# Patient Record
Sex: MALE | Race: OTHER | NOT HISPANIC OR LATINO | URBAN - METROPOLITAN AREA
[De-identification: names, ages, dates, MRNs, and addresses within clinical notes are randomized per-mention and may not be internally consistent; named-entity substitution may affect disease eponyms.]

---

## 2023-04-07 ENCOUNTER — OFFICE VISIT (OUTPATIENT)
Dept: FAMILY MEDICINE CLINIC | Facility: CLINIC | Age: 58
End: 2023-04-07

## 2023-04-07 VITALS
OXYGEN SATURATION: 99 % | WEIGHT: 180 LBS | HEIGHT: 70 IN | SYSTOLIC BLOOD PRESSURE: 120 MMHG | TEMPERATURE: 97.7 F | BODY MASS INDEX: 25.77 KG/M2 | RESPIRATION RATE: 16 BRPM | HEART RATE: 79 BPM | DIASTOLIC BLOOD PRESSURE: 80 MMHG

## 2023-04-07 DIAGNOSIS — Z11.59 ENCOUNTER FOR HEPATITIS C SCREENING TEST FOR LOW RISK PATIENT: ICD-10-CM

## 2023-04-07 DIAGNOSIS — Z13.0 SCREENING FOR DEFICIENCY ANEMIA: ICD-10-CM

## 2023-04-07 DIAGNOSIS — Z13.1 SCREENING FOR DIABETES MELLITUS: Primary | ICD-10-CM

## 2023-04-07 DIAGNOSIS — Z11.4 SCREENING FOR HIV (HUMAN IMMUNODEFICIENCY VIRUS): ICD-10-CM

## 2023-04-07 DIAGNOSIS — Z12.83 SKIN CANCER SCREENING: ICD-10-CM

## 2023-04-07 DIAGNOSIS — N40.0 BENIGN PROSTATIC HYPERPLASIA WITHOUT LOWER URINARY TRACT SYMPTOMS: ICD-10-CM

## 2023-04-07 DIAGNOSIS — Z12.11 SCREENING FOR COLON CANCER: Primary | ICD-10-CM

## 2023-04-07 DIAGNOSIS — E78.2 MIXED HYPERLIPIDEMIA: ICD-10-CM

## 2023-04-07 DIAGNOSIS — Z23 NEED FOR VACCINATION: ICD-10-CM

## 2023-04-07 DIAGNOSIS — Z12.5 SCREENING FOR PROSTATE CANCER: ICD-10-CM

## 2023-04-07 NOTE — PROGRESS NOTES
[facility]    NAME: Simone Donovan  AGE: 65 y o  SEX: male  : 1965     DATE: 2023     Assessment and Plan:     Patient was seen for annual physical exam   He is also here to establish care after transferring from his previous PCP  He has no complaints  His physical exam today was unremarkable  His past medical history was reviewed including history of hypertension and prior history of atrial fibrillation which was treated with ablation  He sees cardiologist at [facility] annually  He requested referrals to urology as well and has dermatology  He is due for routine screening blood work which was ordered today  He is due for colonoscopy which was also ordered as well  Problem List Items Addressed This Visit    None  Visit Diagnoses     Screening for colon cancer    -  Primary    Relevant Orders    Ambulatory referral for colonoscopy    Need for vaccination        Mixed hyperlipidemia        Relevant Orders    Comprehensive metabolic panel    Lipid Panel with Direct LDL reflex    TSH, 3rd generation    Screening for prostate cancer        Relevant Orders    PSA, Total Screen    Encounter for hepatitis C screening test for low risk patient        Relevant Orders    Hepatitis C antibody    Screening for HIV (human immunodeficiency virus)        Relevant Orders    : HIV 1/2 AB/AG w Reflex [facility] for 2 yr old and above    Screening for deficiency anemia        Relevant Orders    CBC and differential    Skin cancer screening        Relevant Orders    Ambulatory Referral to Dermatology    Benign prostatic hyperplasia without lower urinary tract symptoms        Relevant Orders    Ambulatory Referral to Urology          Immunizations and preventive care screenings were discussed with patient today  Appropriate education was printed on patient's after visit summary      Discussed risks and benefits of prostate cancer screening  We discussed the controversial history of PSA screening for prostate cancer in the [place] as well as the risk of over detection and over treatment of prostate cancer by way of PSA screening  The patient understands that PSA blood testing is an imperfect way to screen for prostate cancer and that elevated PSA levels in the blood may also be caused by infection, inflammation, prostatic trauma or manipulation, urological procedures, or by benign prostatic enlargement  The role of the digital rectal examination in prostate cancer screening was also discussed and I discussed with him that there is large interobserver variability in the findings of digital rectal examination  Counseling:  Alcohol/drug use: discussed moderation in alcohol intake, the recommendations for healthy alcohol use, and avoidance of illicit drug use  Dental Health: discussed importance of regular tooth brushing, flossing, and dental visits  Injury prevention: discussed safety/seat belts, safety helmets, smoke detectors, carbon dioxide detectors, and smoking near bedding or upholstery  Exercise: the importance of regular exercise/physical activity was discussed  Recommend exercise 3-5 times per week for at least 30 minutes  Return in about 6 months (around 10/7/2023)  Chief Complaint:     Chief Complaint   Patient presents with   • Establish Care      History of Present Illness:     Adult Annual Physical   Patient here for a comprehensive physical exam  The patient reports no problems  Diet and Physical Activity  Diet/Nutrition: well balanced diet and consuming 3-5 servings of fruits/vegetables daily  Exercise: walking and strength training exercises        Depression Screening  PHQ-2/9 Depression Screening    Little interest or pleasure in doing things: 0 - not at all  Feeling down, depressed, or hopeless: 0 - not at all  PHQ-2 Score: 0  PHQ-2 Interpretation: Negative depression screen       General Health  Sleep: gets 4-6 hours of sleep on average  Hearing: normal - bilateral   Vision: goes for regular eye exams, most recent eye exam <1 year ago and wears glasses  Dental: regular dental visits   Health  Symptoms include: nocturia     Review of Systems:     Review of Systems   Constitutional: Negative  HENT: Negative  Negative for congestion, ear pain, hearing loss, nosebleeds, sore throat and trouble swallowing  Eyes: Negative  Respiratory: Negative for apnea, cough, chest tightness, shortness of breath and wheezing  Cardiovascular: Negative  Gastrointestinal: Negative for abdominal pain, blood in stool, constipation, diarrhea, nausea and vomiting  Endocrine: Negative  Genitourinary: Negative for difficulty urinating, dysuria, frequency, hematuria and urgency  Musculoskeletal: Negative for arthralgias, joint swelling and myalgias  Skin: Negative for rash  Neurological: Negative for dizziness, syncope, light-headedness, numbness and headaches  Hematological: Negative  Psychiatric/Behavioral: Negative for confusion, dysphoric mood and sleep disturbance  The patient is not nervous/anxious  Past Medical History:     Past Medical History:   Diagnosis Date   • [facility] ([facility] )    • Hypertension       Past Surgical History:     Past Surgical History:   Procedure Laterality Date   • HERNIA REPAIR     • VASECTOMY        Family History:     History reviewed  No pertinent family history  Social History:     Social History     Socioeconomic History   • Marital status: /Civil Union     Spouse name: None   • Number of children: None   • Years of education: None   • Highest education level: None   Occupational History   • None   Tobacco Use   • Smoking status: Never   • Smokeless tobacco: Never   Vaping Use   • Vaping Use: Never used   Substance and Sexual Activity   • Alcohol use:  Yes   • Drug use: None   • Sexual activity: None   Other Topics Concern   • None   Social "History Narrative   • None     Social Determinants of Health     Financial Resource Strain: Not on file   Food Insecurity: Not on file   Transportation Needs: Not on file   Physical Activity: Not on file   Stress: Not on file   Social Connections: Not on file   Intimate Partner Violence: Not on file   Housing Stability: Not on file      Current Medications:     Current Outpatient Medications   Medication Sig Dispense Refill   • amLODIPine (NORVASC) 2 5 mg tablet Take 2 5 mg by mouth daily     • Ascorbic Acid (vitamin C) 100 MG tablet Take 100 mg by mouth daily     • Saw Torreon 160 MG CAPS Take by mouth     • Turmeric 500 MG CAPS Take by mouth     • valsartan-hydrochlorothiazide (DIOVAN-HCT) 80-12 5 MG per tablet Take 1 tablet by mouth daily       No current facility-administered medications for this visit  Allergies:     No Known Allergies   Physical Exam:     /80 (BP Location: Right arm, Patient Position: Sitting, Cuff Size: Adult)   Pulse 79   Temp 97 7 °F (36 5 °C) (Temporal)   Resp 16   Ht 5' 10\" (1 778 m)   Wt 81 6 kg (180 lb)   SpO2 99%   BMI 25 83 kg/m²     Physical Exam  Vitals and nursing note reviewed  Constitutional:       Appearance: He is well-developed  HENT:      Head: Normocephalic  Right Ear: External ear normal       Left Ear: External ear normal       Nose: Nose normal    Eyes:      Conjunctiva/sclera: Conjunctivae normal       Pupils: Pupils are equal, round, and reactive to light  Cardiovascular:      Rate and Rhythm: Normal rate and regular rhythm  Heart sounds: Normal heart sounds  Pulmonary:      Effort: Pulmonary effort is normal       Breath sounds: Normal breath sounds  Abdominal:      General: Bowel sounds are normal       Palpations: Abdomen is soft  Musculoskeletal:         General: Normal range of motion  Cervical back: Normal range of motion and neck supple  Skin:     General: Skin is warm and dry     Psychiatric:         Behavior: " Behavior normal          Thought Content:  Thought content normal          Judgment: Judgment normal           Ghazal Gabriel, DO  [facility]

## 2023-05-04 ENCOUNTER — TELEPHONE (OUTPATIENT)
Dept: GASTROENTEROLOGY | Facility: CLINIC | Age: 58
End: 2023-05-04

## 2023-05-04 ENCOUNTER — PREP FOR PROCEDURE (OUTPATIENT)
Dept: GASTROENTEROLOGY | Facility: CLINIC | Age: 58
End: 2023-05-04

## 2023-05-04 DIAGNOSIS — Z12.11 SCREENING FOR COLON CANCER: Primary | ICD-10-CM

## 2023-05-08 DIAGNOSIS — I10 ESSENTIAL HYPERTENSION: Primary | ICD-10-CM

## 2023-05-13 RX ORDER — AMLODIPINE AND VALSARTAN 5; 160 MG/1; MG/1
1 TABLET ORAL DAILY
Qty: 30 TABLET | Refills: 5
Start: 2023-05-13 | End: 2023-05-18 | Stop reason: SDUPTHER

## 2023-05-18 DIAGNOSIS — I10 ESSENTIAL HYPERTENSION: ICD-10-CM

## 2023-05-18 RX ORDER — AMLODIPINE AND VALSARTAN 5; 160 MG/1; MG/1
1 TABLET ORAL DAILY
Qty: 30 TABLET | Refills: 5 | Status: SHIPPED | OUTPATIENT
Start: 2023-05-18

## 2023-05-30 ENCOUNTER — TELEPHONE (OUTPATIENT)
Dept: GASTROENTEROLOGY | Facility: MEDICAL CENTER | Age: 58
End: 2023-05-30

## 2023-05-30 DIAGNOSIS — Z12.11 SCREENING FOR COLON CANCER: Primary | ICD-10-CM

## 2023-05-30 RX ORDER — BISACODYL 5 MG/1
TABLET, DELAYED RELEASE ORAL
Qty: 2 TABLET | Refills: 0 | Status: SHIPPED | OUTPATIENT
Start: 2023-05-30

## 2023-05-30 NOTE — TELEPHONE ENCOUNTER
Spoke with patient to confirm 6/16 procedure, informed that prep instructions sent to [facility] and prep medication sent to pharmacy

## 2023-06-15 RX ORDER — SODIUM CHLORIDE 9 MG/ML
125 INJECTION, SOLUTION INTRAVENOUS CONTINUOUS
Status: CANCELLED | OUTPATIENT
Start: 2023-06-15

## 2023-06-16 ENCOUNTER — ANESTHESIA EVENT (OUTPATIENT)
Dept: GASTROENTEROLOGY | Facility: MEDICAL CENTER | Age: 58
End: 2023-06-16

## 2023-06-16 ENCOUNTER — ANESTHESIA (OUTPATIENT)
Dept: GASTROENTEROLOGY | Facility: MEDICAL CENTER | Age: 58
End: 2023-06-16

## 2023-06-16 ENCOUNTER — HOSPITAL ENCOUNTER (OUTPATIENT)
Dept: GASTROENTEROLOGY | Facility: MEDICAL CENTER | Age: 58
Setting detail: OUTPATIENT SURGERY
End: 2023-06-16
Payer: COMMERCIAL

## 2023-06-16 VITALS
HEIGHT: 70 IN | HEART RATE: 62 BPM | BODY MASS INDEX: 24.33 KG/M2 | DIASTOLIC BLOOD PRESSURE: 64 MMHG | TEMPERATURE: 98 F | RESPIRATION RATE: 18 BRPM | OXYGEN SATURATION: 98 % | SYSTOLIC BLOOD PRESSURE: 107 MMHG | WEIGHT: 169.97 LBS

## 2023-06-16 DIAGNOSIS — Z12.11 SCREENING FOR COLON CANCER: ICD-10-CM

## 2023-06-16 PROCEDURE — 45385 COLONOSCOPY W/LESION REMOVAL: CPT | Performed by: STUDENT IN AN ORGANIZED HEALTH CARE EDUCATION/TRAINING PROGRAM

## 2023-06-16 PROCEDURE — 88305 TISSUE EXAM BY PATHOLOGIST: CPT | Performed by: PATHOLOGY

## 2023-06-16 RX ORDER — PROPOFOL 10 MG/ML
INJECTION, EMULSION INTRAVENOUS AS NEEDED
Status: DISCONTINUED | OUTPATIENT
Start: 2023-06-16 | End: 2023-06-16

## 2023-06-16 RX ORDER — SODIUM CHLORIDE 9 MG/ML
125 INJECTION, SOLUTION INTRAVENOUS CONTINUOUS
Status: DISCONTINUED | OUTPATIENT
Start: 2023-06-16 | End: 2023-06-20 | Stop reason: HOSPADM

## 2023-06-16 RX ORDER — MULTIVITAMIN
1 TABLET ORAL DAILY
COMMUNITY

## 2023-06-16 RX ORDER — LIDOCAINE HYDROCHLORIDE 20 MG/ML
INJECTION, SOLUTION EPIDURAL; INFILTRATION; INTRACAUDAL; PERINEURAL AS NEEDED
Status: DISCONTINUED | OUTPATIENT
Start: 2023-06-16 | End: 2023-06-16

## 2023-06-16 RX ORDER — PROPOFOL 10 MG/ML
INJECTION, EMULSION INTRAVENOUS CONTINUOUS PRN
Status: DISCONTINUED | OUTPATIENT
Start: 2023-06-16 | End: 2023-06-16

## 2023-06-16 RX ADMIN — PROPOFOL 160 MCG/KG/MIN: 10 INJECTION, EMULSION INTRAVENOUS at 09:36

## 2023-06-16 RX ADMIN — LIDOCAINE HYDROCHLORIDE 100 MG: 20 INJECTION, SOLUTION EPIDURAL; INFILTRATION; INTRACAUDAL; PERINEURAL at 09:36

## 2023-06-16 RX ADMIN — SODIUM CHLORIDE 125 ML/HR: 0.9 INJECTION, SOLUTION INTRAVENOUS at 09:22

## 2023-06-16 RX ADMIN — Medication 40 MG: at 09:44

## 2023-06-16 RX ADMIN — SODIUM CHLORIDE: 0.9 INJECTION, SOLUTION INTRAVENOUS at 09:59

## 2023-06-16 RX ADMIN — PROPOFOL 150 MG: 10 INJECTION, EMULSION INTRAVENOUS at 09:36

## 2023-06-16 NOTE — ANESTHESIA POSTPROCEDURE EVALUATION
Post-Op Assessment Note    CV Status:  Stable    Pain management: adequate     Mental Status:  Alert and awake   Hydration Status:  Euvolemic   PONV Controlled:  Controlled   Airway Patency:  Patent      Post Op Vitals Reviewed: Yes      Staff: Anesthesiologist         No notable events documented      /65 (06/16/23 1008)    Temp      Pulse 75 (06/16/23 1008)   Resp 20 (06/16/23 1008)    SpO2 98 % (06/16/23 1008)

## 2023-06-16 NOTE — ANESTHESIA PREPROCEDURE EVALUATION
Procedure:  COLONOSCOPY    Relevant Problems   CARDIO   (+) Hypertension        Physical Exam    Airway    Mallampati score: I  TM Distance: >3 FB  Neck ROM: full     Dental       Cardiovascular  Rhythm: regular, Rate: normal, Cardiovascular exam normal    Pulmonary  Pulmonary exam normal     Other Findings        Anesthesia Plan  ASA Score- 2     Anesthesia Type- IV sedation with anesthesia with ASA Monitors  Additional Monitors:   Airway Plan:           Plan Factors-Exercise tolerance (METS): >4 METS  Chart reviewed  Existing labs reviewed  Patient summary reviewed  Patient is not a current smoker  Obstructive sleep apnea risk education given perioperatively  Induction- intravenous  Postoperative Plan-     Informed Consent- Anesthetic plan and risks discussed with patient

## 2023-06-16 NOTE — H&P
"History and Physical - [facility]  Renzo Yin 65 y o  male MRN: [id number]          HPI: Renzo Yin is a 65y o  year old male who presents for screening colonoscopy  Reportedly had a normal colonoscopy in 2012 (in [address] [place] [address])  No family history of colon cancer  REVIEW OF SYSTEMS: Per the HPI, and otherwise unremarkable  Historical Information   Past Medical History:   Diagnosis Date   • A-fib ([address] )    • Hypertension      Past Surgical History:   Procedure Laterality Date   • CARDIAC ELECTROPHYSIOLOGY MAPPING AND ABLATION     • COLONOSCOPY     • HERNIA REPAIR     • VASECTOMY       Social History   Social History     Substance and Sexual Activity   Alcohol Use Yes    Comment: occasional     Social History     Substance and Sexual Activity   Drug Use Never     Social History     Tobacco Use   Smoking Status Never   Smokeless Tobacco Never     History reviewed  No pertinent family history      Meds/Allergies       Current Outpatient Medications:   •  amLODIPine-valsartan (EXFORGE) 5-160 MG per tablet  •  Ascorbic Acid (vitamin C) 100 MG tablet  •  loratadine (CLARITIN) 5 MG chewable tablet  •  Multiple Vitamin (multivitamin) tablet  •  Saw Palmetto 160 MG CAPS  •  Turmeric 500 MG CAPS  •  amLODIPine (NORVASC) 2 5 mg tablet  •  bisacodyl (DULCOLAX) 5 mg EC tablet  •  polyethylene glycol (GOLYTELY) 4000 mL solution    Current Facility-Administered Medications:   •  sodium chloride 0 9 % infusion, 125 mL/hr, Intravenous, Continuous, 125 mL/hr at 06/16/23 4836    No Known Allergies    Objective     /76   Pulse 66   Temp 98 °F (36 7 °C) (Temporal)   Resp 15   Ht 5' 10\" (1 778 m)   Wt 77 1 kg (169 lb 15 6 oz)   SpO2 98%   BMI 24 39 kg/m²       PHYSICAL EXAM    GEN: NAD  CARDIO: RRR  PULM: CTA bilaterally  ABD: soft, non-tender, non-distended  EXT: no lower extremity edema  NEURO: AAOx3      ASSESSMENT/PLAN:  65y o  year old male here for colonoscopy; he is stable and " optimized for his procedure

## 2023-06-20 PROCEDURE — 88305 TISSUE EXAM BY PATHOLOGIST: CPT | Performed by: PATHOLOGY

## 2023-07-06 ENCOUNTER — OFFICE VISIT (OUTPATIENT)
Dept: DERMATOLOGY | Facility: CLINIC | Age: 58
End: 2023-07-06
Payer: COMMERCIAL

## 2023-07-06 VITALS — WEIGHT: 174.1 LBS | BODY MASS INDEX: 24.92 KG/M2 | HEIGHT: 70 IN | TEMPERATURE: 98 F

## 2023-07-06 DIAGNOSIS — Z12.83 SCREENING FOR SKIN CANCER: Primary | ICD-10-CM

## 2023-07-06 PROCEDURE — 99202 OFFICE O/P NEW SF 15 MIN: CPT | Performed by: DERMATOLOGY

## 2023-07-06 NOTE — PATIENT INSTRUCTIONS
What is skin cancer? Skin cancer is unfortunately very common. That's why we are here to help you on your journey to healthy happy skin! There are two main types of skin cancer: melanoma and non-melanoma skin cancer. Melanoma is a form of skin cancer that often arises within an existing nevus or mole. However, this is not always the case. Melanoma can arise anywhere (not only where you have moles right now). Melanoma can run in families, so letting us know about your family history is important. Non-melanoma skin cancer is the most common type of cancer in the [facility]. The two main types of non-melanoma skin cancers are basal cell carcinomas (BCC) and squamous cell carcinoma (SCC). These cancers tend to be less aggressive than melanomas but are still important to look for and treat. What can I do to prevent skin cancer? One of the largest risk factors for skin cancer is sun exposure or UV radiation. Therefore, sun protection is aguilar! Here are some great tips for protecting yourself! Try to avoid direct sun exposure during peak sun hours (10 AM to 2 PM)  Remember you get A LOT of sun even under cloud coverage and through care windows! When choosing a sunscreen, look for one that says “broad spectrum” sunscreen. This means it protects you from more of the harmful UV rays. Choose a sunscreen that is SPF 30 or greater for best protection. Apply sunscreen to all sun-exposed skin and reapply every 2 hours. Consider sun protective clothing! Great additions to your sun protective clothing wardrobe include broad brimmed hats, sunglasses, UPF clothing. Avoid tanning salons. These have been shown to be very harmful in terms of your risk of skin cancer. Avoid “base tans”. We now know that tans are dangerous (not just sun burns). If you want to have a tan for a trip, consider a spray tan! Should I check my skin at home between my dermatology appointments? Yes!  It's always a great idea to look at your skin on a regular basis. Here are some things to look for when monitoring your skin. For melanoma, look for the ABCDE's! A = Asymmetry. Look for a spot where one half does not match the other! B = Borders. Look for a spot that has jagged, ragged or irregular borders. C = Color. Look for a spot that is not evenly colored and often includes multiple colors, especially true black, red, white, blue, grey. D = Diameter. Look for a spot that is larger than the size of a pencil eraser. E = Evolution. If you ever have a spot that is changing in shape, color, size or symptoms (becomes itchy, painful or starts to bleed), always call us! For non-melanoma skin cancers, look for a new, pink spot that is not going away, especially one that is itchy, painful or bleeding. What should I do if I see a spot that is concerning for melanoma or non-melanoma skin cancer? If you are ever concerned, call us! Do not wait for your next appointment. We want to help!

## 2023-07-06 NOTE — PROGRESS NOTES
[facility]atology [facility] Note     Patient Name: Maile Kenney  Encounter Date: 7/6/23    Have you been cared for by a [facility] Dermatologist in the last 3 years and, if so, which description applies to you? NO. I am considered a "new" patient and must complete all patient intake questions. I am MALE/not capable of bearing children. REVIEW OF SYSTEMS:  Have you recently had or currently have any of the following? · Recent fever or chills? No  · Any non-healing wound? No   PAST MEDICAL HISTORY:  Have you personally ever had or currently have any of the following? If "YES," then please provide more detail. · Skin cancer (such as Melanoma, Basal Cell Carcinoma, Squamous Cell Carcinoma? No   · Tuberculosis, HIV/AIDS, Hepatitis B or C: No  · Systemic Immunosuppression such as Diabetes, Biologic or Immunotherapy, Chemotherapy, Organ Transplantation, Bone Marrow Transplantation No  · Radiation Treatment No   FAMILY HISTORY:  Any "first degree relatives" (parent, brother, sister, or child) with the following? • Skin Cancer, Pancreatic or Other Cancer? No   PATIENT EXPERIENCE:    • Do you want the Dermatologist to perform a COMPLETE skin exam today including a clinical examination under the "bra and underwear" areas? Yes  • If necessary, do we have your permission to call and leave a detailed message on your Preferred Phone number that includes your specific medical information?   Yes      No Known Allergies   Current Outpatient Medications:   •  amLODIPine (NORVASC) 2.5 mg tablet, Take 2.5 mg by mouth daily, Disp: , Rfl:   •  amLODIPine-valsartan (EXFORGE) 5-160 MG per tablet, Take 1 tablet by mouth daily, Disp: 30 tablet, Rfl: 5  •  Ascorbic Acid (vitamin C) 100 MG tablet, Take 100 mg by mouth daily, Disp: , Rfl:   •  loratadine (CLARITIN) 5 MG chewable tablet, Chew 5 mg daily, Disp: , Rfl:   •  Multiple Vitamin (multivitamin) tablet, Take 1 tablet by mouth daily, Disp: , Rfl:   •  Saw Palmetto 160 MG CAPS, Take by mouth, Disp: , Rfl:   •  Turmeric 500 MG CAPS, Take by mouth, Disp: , Rfl:           • Whom besides the patient is providing clinical information about today's encounter?   o NO ADDITIONAL HISTORIAN (patient alone provided history)    Physical Exam and Assessment/Plan by Diagnosis:    SCREENING FOR SKIN CANCER    Physical Exam:  • Anatomic Location Affected:  Entire body   • Morphological Description:  Normal skin appearance   • Pertinent Positives:  • Pertinent Negatives: Additional History of Present Condition:  New patient, 65year old male, here for a general skin exam. No personal or family history of skin cancer.     Assessment and Plan:  Based on a thorough discussion of this condition and the management approach to it (including a comprehensive discussion of the known risks, side effects and potential benefits of treatment), the patient (family) agrees to implement the following specific plan:  • SPF 30+ when outside with reapplication every 2-3 hours        Scribe Attestation    I,:  Saleem Bae am acting as a scribe while in the presence of the attending physician.:       I,:  Micheal Lott MD personally performed the services described in this documentation    as scribed in my presence.:

## 2023-07-11 DIAGNOSIS — I10 ESSENTIAL HYPERTENSION: ICD-10-CM

## 2023-07-11 RX ORDER — AMLODIPINE AND VALSARTAN 5; 160 MG/1; MG/1
1 TABLET ORAL DAILY
Qty: 90 TABLET | Refills: 1 | Status: SHIPPED | OUTPATIENT
Start: 2023-07-11

## 2023-07-11 NOTE — TELEPHONE ENCOUNTER
----- Message from Kimberly Mendosa sent at 7/9/2023 10:20 AM EDT -----  Regarding: Amlodipine/Valsartan  Contact: [phone]  Dr Janee Stafford,   Since this medication works well, can you change it to a 90 day script? The month goes by very quickly.   Thanks

## 2023-09-14 DIAGNOSIS — E78.2 MIXED HYPERLIPIDEMIA: Primary | ICD-10-CM

## 2023-10-12 ENCOUNTER — OFFICE VISIT (OUTPATIENT)
Dept: DERMATOLOGY | Facility: CLINIC | Age: 58
End: 2023-10-12
Payer: COMMERCIAL

## 2023-10-12 VITALS — BODY MASS INDEX: 24.34 KG/M2 | HEIGHT: 70 IN | WEIGHT: 170 LBS | TEMPERATURE: 98 F

## 2023-10-12 DIAGNOSIS — L81.4 LENTIGO: ICD-10-CM

## 2023-10-12 DIAGNOSIS — D22.9 NEVUS: ICD-10-CM

## 2023-10-12 DIAGNOSIS — D18.01 CHERRY ANGIOMA: Primary | ICD-10-CM

## 2023-10-12 DIAGNOSIS — L82.1 SEBORRHEIC KERATOSIS: ICD-10-CM

## 2023-10-12 PROCEDURE — 99213 OFFICE O/P EST LOW 20 MIN: CPT | Performed by: DERMATOLOGY

## 2023-10-12 NOTE — PROGRESS NOTES
[facility] Note     Patient Name: Chi Morris  Encounter Date: 10/12/2023     Have you been cared for by a [facility] in the last 3 years and, if so, which description applies to you? Yes. I have been here within the last 3 years, and my medical history has NOT changed since that time. I am MALE/not capable of bearing children. REVIEW OF SYSTEMS:  Have you recently had or currently have any of the following? No changes in my recent health. PAST MEDICAL HISTORY:  Have you personally ever had or currently have any of the following? If "YES," then please provide more detail. No changes in my medical history. FAMILY HISTORY:  Any "first degree relatives" (parent, brother, sister, or child) with the following? No changes in my family's known health. PATIENT EXPERIENCE:    Do you want the Dermatologist to perform a COMPLETE skin exam today including a clinical examination under the "bra and underwear" areas? Yes, refused groin and buttocks examine. If necessary, do we have your permission to call and leave a detailed message on your Preferred Phone number that includes your specific medical information? Yes      No Known Allergies   Current Outpatient Medications:     amLODIPine-valsartan (EXFORGE) 5-160 MG per tablet, Take 1 tablet by mouth daily, Disp: 90 tablet, Rfl: 1    Ascorbic Acid (vitamin C) 100 MG tablet, Take 100 mg by mouth daily, Disp: , Rfl:     loratadine (CLARITIN) 5 MG chewable tablet, Chew 5 mg daily, Disp: , Rfl:     Multiple Vitamin (multivitamin) tablet, Take 1 tablet by mouth daily, Disp: , Rfl:     Saw Palmetto 160 MG CAPS, Take by mouth, Disp: , Rfl:     Turmeric 500 MG CAPS, Take by mouth, Disp: , Rfl:     amLODIPine (NORVASC) 2.5 mg tablet, Take 2.5 mg by mouth daily, Disp: , Rfl:           Whom besides the patient is providing clinical information about today's encounter?    NO ADDITIONAL HISTORIAN (patient alone provided history)    Physical Exam and Assessment/Plan by Diagnosis:      Chief Complaint   Patient presents with    Follow-up     Concerns/ lesions on his back/ pt state had bbc hx on his back          MELANOCYTIC NEVI ("Moles")    Physical Exam:  Anatomic Location Affected:   Mostly on sun-exposed areas of the trunk and extremities  Morphological Description:  Scattered, 1-4mm round to ovoid, symmetrical-appearing, even bordered, skin colored to dark brown macules/papules, mostly in sun-exposed areas  Pertinent Positives:  Pertinent Negatives: Additional History of Present Condition:      Assessment and Plan:  Based on a thorough discussion of this condition and the management approach to it (including a comprehensive discussion of the known risks, side effects and potential benefits of treatment), the patient (family) agrees to implement the following specific plan:  When outside we recommend using a wide brim hat, sunglasses, long sleeve and pants, sunscreen with SPF 98+ with reapplication every 2 hours, or SPF specific clothing   Benign, reassured  Annual skin check     Melanocytic Nevi  Melanocytic nevi ("moles") are tan or brown, raised or flat areas of the skin which have an increased number of melanocytes. Melanocytes are the cells in our body which make pigment and account for skin color. Some moles are present at birth (I.e., "congenital nevi"), while others come up later in life (i.e., "acquired nevi"). The sun can stimulate the body to make more moles. Sunburns are not the only thing that triggers more moles. Chronic sun exposure can do it too. Clinically distinguishing a healthy mole from melanoma may be difficult, even for experienced dermatologists. The "ABCDE's" of moles have been suggested as a means of helping to alert a person to a suspicious mole and the possible increased risk of melanoma.   The suggestions for raising alert are as follows:    Asymmetry: Healthy moles tend to be symmetric, while melanomas are often asymmetric. Asymmetry means if you draw a line through the mole, the two halves do not match in color, size, shape, or surface texture. Asymmetry can be a result of rapid enlargement of a mole, the development of a raised area on a previously flat lesion, scaling, ulceration, bleeding or scabbing within the mole. Any mole that starts to demonstrate "asymmetry" should be examined promptly by a board certified dermatologist.     Border: Healthy moles tend to have discrete, even borders. The border of a melanoma often blends into the normal skin and does not sharply delineate the mole from normal skin. Any mole that starts to demonstrate "uneven borders" should be examined promptly by a board certified dermatologist.     Color: Healthy moles tend to be one color throughout. Melanomas tend to be made up of different colors ranging from dark black, blue, white, or red. Any mole that demonstrates a color change should be examined promptly by a board certified dermatologist.     Diameter: Healthy moles tend to be smaller than 0.6 cm in size; an exception are "congenital nevi" that can be larger. Melanomas tend to grow and can often be greater than 0.6 cm (1/4 of an inch, or the size of a pencil eraser). This is only a guideline, and many normal moles may be larger than 0.6 cm without being unhealthy. Any mole that starts to change in size (small to bigger or bigger to smaller) should be examined promptly by a board certified dermatologist.     Evolving: Healthy moles tend to "stay the same."  Melanomas may often show signs of change or evolution such as a change in size, shape, color, or elevation.   Any mole that starts to itch, bleed, crust, burn, hurt, or ulcerate or demonstrate a change or evolution should be examined promptly by a board certified dermatologist.        LENTIGO    Physical Exam:  Anatomic Location Affected:  trunk, arms  Morphological Description:  Light brown macules  Pertinent Positives:  Pertinent Negatives: Additional History of Present Condition:      Assessment and Plan:  Based on a thorough discussion of this condition and the management approach to it (including a comprehensive discussion of the known risks, side effects and potential benefits of treatment), the patient (family) agrees to implement the following specific plan:  When outside we recommend using a wide brim hat, sunglasses, long sleeve and pants, sunscreen with SPF 10+ with reapplication every 2 hours, or SPF specific clothing       What is a lentigo? A lentigo is a pigmented flat or slightly raised lesion with a clearly defined edge. Unlike an ephelis (freckle), it does not fade in the winter months. There are several kinds of lentigo. The name lentigo originally referred to its appearance resembling a small lentil. The plural of lentigo is lentigines, although “lentigos” is also in common use. Who gets lentigines? Lentigines can affect males and females of all ages and races. Solar lentigines are especially prevalent in fair skinned adults. Lentigines associated with syndromes are present at birth or arise during childhood. What causes lentigines? Common forms of lentigo are due to exposure to ultraviolet radiation:  Sun damage including sunburn   Indoor tanning   Phototherapy, especially photochemotherapy (PUVA)    Ionizing radiation, eg radiation therapy, can also cause lentigines. Several familial syndromes associated with widespread lentigines originate from mutations in Dennis-MAP kinase, mTOR signaling and PTEN pathways. What is the treatment for lentigines? Most lentigines are left alone. Attempts to lighten them may not be successful.  The following approaches are used:  SPF 50+ broad-spectrum sunscreen   Hydroquinone bleaching cream   Alpha hydroxy acids   Vitamin C   Retinoids   Azelaic acid   Chemical peels  Individual lesions can be permanently removed using:  Cryotherapy Intense pulsed light   Pigment lasers    How can lentigines be prevented? Lentigines associated with exposure ultraviolet radiation can be prevented by very careful sun protection. Clothing is more successful at preventing new lentigines than are sunscreens. What is the outlook for lentigines? Lentigines usually persist. They may increase in number with age and sun exposure. Some in sun-protected sites may fade and disappear. CHERRY ANGIOMAS    Physical Exam:  Anatomic Location Affected:  trunk  Morphological Description:  Scattered cherry red, 1-4 mm papules. Pertinent Positives:  Pertinent Negatives: Additional History of Present Condition:      Assessment and Plan:  Based on a thorough discussion of this condition and the management approach to it (including a comprehensive discussion of the known risks, side effects and potential benefits of treatment), the patient (family) agrees to implement the following specific plan:  Monitor for changes  Benign, reassured      Assessment and Plan:    Cherry angioma, also known as Theta Saner spots, are benign vascular skin lesions. A "cherry angioma" is a firm red, blue or purple papule, 0.1-1 cm in diameter. When thrombosed, they can appear black in colour until evaluated with a dermatoscope when the red or purple colour is more easily seen. Cherry angioma may develop on any part of the body but most often appear on the scalp, face, lips and trunk. An angioma is due to proliferating endothelial cells; these are the cells that line the inside of a blood vessel. Angiomas can arise in early life or later in life; the most common type of angioma is a cherry angioma. Cherry angiomas are very common in males and females of any age or race. They are more noticeable in white skin than in skin of colour. They markedly increase in number from about the age of 36. There may be a family history of similar lesions.  Eruptive cherry angiomas have been rarely reported to be associated with internal malignancy. The cause of angiomas is unknown. Genetic analysis of cherry angiomas has shown that they frequently carry specific somatic missense mutations in the GNAQ and GNA11 (Q209H) genes, which are involved in other vascular and melanocytic proliferations. SEBORRHEIC KERATOSIS; NON-INFLAMED    Physical Exam:  Anatomic Location Affected:  trunk  Morphological Description:  Flat and raised, waxy, smooth to warty textured, yellow to brownish-grey to dark brown to blackish, discrete, "stuck-on" appearing papules. Pertinent Positives:  Pertinent Negatives: Additional History of Present Condition:      Assessment and Plan:  Based on a thorough discussion of this condition and the management approach to it (including a comprehensive discussion of the known risks, side effects and potential benefits of treatment), the patient (family) agrees to implement the following specific plan:  Monitor for changes  Benign, reassured      Seborrheic Keratosis  A seborrheic keratosis is a harmless warty spot that appears during adult life as a common sign of skin aging. Seborrheic keratoses can arise on any area of skin, covered or uncovered, with the usual exception of the palms and soles. They do not arise from mucous membranes. Seborrheic keratoses can have highly variable appearance. Seborrheic keratoses are extremely common. It has been estimated that over 90% of adults over the age of 61 years have one or more of them. They occur in males and females of all races, typically beginning to erupt in the 35s or 45s. They are uncommon under the age of 21 years. The precise cause of seborrhoeic keratoses is not known. Seborrhoeic keratoses are considered degenerative in nature. As time goes by, seborrheic keratoses tend to become more numerous. Some people inherit a tendency to develop a very large number of them; some people may have hundreds of them.       There is no easy way to remove multiple lesions on a single occasion. Unless a specific lesion is "inflamed" and is causing pain or stinging/burning or is bleeding, most insurance companies do not authorize treatment. XEROSIS ("DRY SKIN")    Physical Exam:  Anatomic Location Affected:  diffuse  Morphological Description:  xerosis  Pertinent Positives:  Pertinent Negatives: Additional History of Present Condition:      Assessment and Plan:  Based on a thorough discussion of this condition and the management approach to it (including a comprehensive discussion of the known risks, side effects and potential benefits of treatment), the patient (family) agrees to implement the following specific plan:  Use moisturizer like Eucerin,Cerave or Aveeno Cream 3 times a day for the dry skin            Dry skin refers to skin that feels dry to touch. Dry skin has a dull surface with a rough, scaly quality. The skin is less pliable and cracked. When dryness is severe, the skin may become inflamed and fissured. Although any body site can be dry, dry skin tends to affect the shins more than any other site. Dry skin is lacking moisture in the outer horny cell layer (stratum corneum) and this results in cracks in the skin surface. Dry skin is also called xerosis, xeroderma or asteatosis (lack of fat). It can affect males and females of all ages. There is some racial variability in water and lipid content of the skin. Dry skin that starts in early childhood may be one of about 20 types of ichthyosis (fish-scale skin). There is often a family history of dry skin. Dry skin is commonly seen in people with atopic dermatitis. Nearly everyone > 60 years has dry skin. Dry skin that begins later may be seen in people with certain diseases and conditions.   Postmenopausal women  Hypothyroidism  Chronic renal disease   Malnutrition and weight loss   Subclinical dermatitis   Treatment with certain drugs such as oral retinoids, diuretics and epidermal growth factor receptor inhibitors      What is the treatment for dry skin? The mainstay of treatment of dry skin and ichthyosis is moisturisers/emollients. They should be applied liberally and often enough to:  Reduce itch   Improve the barrier function   Prevent entry of irritants, bacteria   Reduce transepidermal water loss. How can dry skin be prevented? Eliminate aggravating factors:  Reduce the frequency of bathing. A humidifier in winter and air conditioner in summer   Compare having a short shower with a prolonged soak in a bath. Use lukewarm, not hot, water. Replace standard soap with a substitute such as a synthetic detergent cleanser, water-miscible emollient, bath oil, anti-pruritic tar oil, colloidal oatmeal etc.   Apply an emollient liberally and often, particularly shortly after bathing, and when itchy. The drier the skin, the thicker this should be, especially on the hands. What is the outlook for dry skin? A tendency to dry skin may persist life-long, or it may improve once contributing factors are controlled. History of skin cancer, possibly bcc   Physical Exam:  Anatomic Location Affected:  upper back   Morphological Description:  well heal scar   Pertinent Positives:  Pertinent Negatives: Additional History of Present Condition: patient state 10 years ago. Assessment and Plan:  Based on a thorough discussion of this condition and the management approach to it (including a comprehensive discussion of the known risks, side effects and potential benefits of treatment), the patient (family) agrees to implement the following specific plan:  Continue to monitor regular skin checks.                Scribe Attestation      I,:  Grace am acting as a scribe while in the presence of the attending physician.:       I,:  Pamela Pack MD personally performed the services described in this documentation    as scribed in my presence.:

## 2023-10-12 NOTE — PATIENT INSTRUCTIONS
MELANOCYTIC NEVI ("Moles")       Assessment and Plan:  Based on a thorough discussion of this condition and the management approach to it (including a comprehensive discussion of the known risks, side effects and potential benefits of treatment), the patient (family) agrees to implement the following specific plan:  When outside we recommend using a wide brim hat, sunglasses, long sleeve and pants, sunscreen with SPF 33+ with reapplication every 2 hours, or SPF specific clothing   Benign, reassured  Annual skin check     Melanocytic Nevi  Melanocytic nevi ("moles") are tan or brown, raised or flat areas of the skin which have an increased number of melanocytes. Melanocytes are the cells in our body which make pigment and account for skin color. Some moles are present at birth (I.e., "congenital nevi"), while others come up later in life (i.e., "acquired nevi"). The sun can stimulate the body to make more moles. Sunburns are not the only thing that triggers more moles. Chronic sun exposure can do it too. Clinically distinguishing a healthy mole from melanoma may be difficult, even for experienced dermatologists. The "ABCDE's" of moles have been suggested as a means of helping to alert a person to a suspicious mole and the possible increased risk of melanoma. The suggestions for raising alert are as follows:    Asymmetry: Healthy moles tend to be symmetric, while melanomas are often asymmetric. Asymmetry means if you draw a line through the mole, the two halves do not match in color, size, shape, or surface texture. Asymmetry can be a result of rapid enlargement of a mole, the development of a raised area on a previously flat lesion, scaling, ulceration, bleeding or scabbing within the mole. Any mole that starts to demonstrate "asymmetry" should be examined promptly by a board certified dermatologist.     Border: Healthy moles tend to have discrete, even borders.   The border of a melanoma often blends into the normal skin and does not sharply delineate the mole from normal skin. Any mole that starts to demonstrate "uneven borders" should be examined promptly by a board certified dermatologist.     Color: Healthy moles tend to be one color throughout. Melanomas tend to be made up of different colors ranging from dark black, blue, white, or red. Any mole that demonstrates a color change should be examined promptly by a board certified dermatologist.     Diameter: Healthy moles tend to be smaller than 0.6 cm in size; an exception are "congenital nevi" that can be larger. Melanomas tend to grow and can often be greater than 0.6 cm (1/4 of an inch, or the size of a pencil eraser). This is only a guideline, and many normal moles may be larger than 0.6 cm without being unhealthy. Any mole that starts to change in size (small to bigger or bigger to smaller) should be examined promptly by a board certified dermatologist.     Evolving: Healthy moles tend to "stay the same."  Melanomas may often show signs of change or evolution such as a change in size, shape, color, or elevation. Any mole that starts to itch, bleed, crust, burn, hurt, or ulcerate or demonstrate a change or evolution should be examined promptly by a board certified dermatologist.        Lesli Stuart and Plan:  Based on a thorough discussion of this condition and the management approach to it (including a comprehensive discussion of the known risks, side effects and potential benefits of treatment), the patient (family) agrees to implement the following specific plan:  When outside we recommend using a wide brim hat, sunglasses, long sleeve and pants, sunscreen with SPF 85+ with reapplication every 2 hours, or SPF specific clothing       What is a lentigo? A lentigo is a pigmented flat or slightly raised lesion with a clearly defined edge. Unlike an ephelis (freckle), it does not fade in the winter months.  There are several kinds of lentigo. The name lentigo originally referred to its appearance resembling a small lentil. The plural of lentigo is lentigines, although “lentigos” is also in common use. Who gets lentigines? Lentigines can affect males and females of all ages and races. Solar lentigines are especially prevalent in fair skinned adults. Lentigines associated with syndromes are present at birth or arise during childhood. What causes lentigines? Common forms of lentigo are due to exposure to ultraviolet radiation:  Sun damage including sunburn   Indoor tanning   Phototherapy, especially photochemotherapy (PUVA)    Ionizing radiation, eg radiation therapy, can also cause lentigines. Several familial syndromes associated with widespread lentigines originate from mutations in Dennis-MAP kinase, mTOR signaling and PTEN pathways. What is the treatment for lentigines? Most lentigines are left alone. Attempts to lighten them may not be successful. The following approaches are used:  SPF 50+ broad-spectrum sunscreen   Hydroquinone bleaching cream   Alpha hydroxy acids   Vitamin C   Retinoids   Azelaic acid   Chemical peels  Individual lesions can be permanently removed using:  Cryotherapy   Intense pulsed light   Pigment lasers    How can lentigines be prevented? Lentigines associated with exposure ultraviolet radiation can be prevented by very careful sun protection. Clothing is more successful at preventing new lentigines than are sunscreens. What is the outlook for lentigines? Lentigines usually persist. They may increase in number with age and sun exposure. Some in sun-protected sites may fade and disappear.     TRAN ANGIOMAS         Assessment and Plan:  Based on a thorough discussion of this condition and the management approach to it (including a comprehensive discussion of the known risks, side effects and potential benefits of treatment), the patient (family) agrees to implement the following specific plan:  Monitor for changes  Benign, reassured      Assessment and Plan:    Cherry angioma, also known as [employer] spots, are benign vascular skin lesions. A "cherry angioma" is a firm red, blue or purple papule, 0.1-1 cm in diameter. When thrombosed, they can appear black in colour until evaluated with a dermatoscope when the red or purple colour is more easily seen. Cherry angioma may develop on any part of the body but most often appear on the scalp, face, lips and trunk. An angioma is due to proliferating endothelial cells; these are the cells that line the inside of a blood vessel. Angiomas can arise in early life or later in life; the most common type of angioma is a cherry angioma. Cherry angiomas are very common in males and females of any age or race. They are more noticeable in white skin than in skin of colour. They markedly increase in number from about the age of [address]. There may be a family history of similar lesions. Eruptive cherry angiomas have been rarely reported to be associated with internal malignancy. The cause of angiomas is unknown. Genetic analysis of cherry angiomas has shown that they frequently carry specific somatic missense mutations in the GNAQ and GNA11 (Q209H) genes, which are involved in other vascular and melanocytic proliferations. SEBORRHEIC KERATOSIS; NON-INFLAMED       Assessment and Plan:  Based on a thorough discussion of this condition and the management approach to it (including a comprehensive discussion of the known risks, side effects and potential benefits of treatment), the patient (family) agrees to implement the following specific plan:  Monitor for changes  Benign, reassured      Seborrheic Keratosis  A seborrheic keratosis is a harmless warty spot that appears during adult life as a common sign of skin aging. Seborrheic keratoses can arise on any area of skin, covered or uncovered, with the usual exception of the palms and soles.  They do not arise from mucous membranes. Seborrheic keratoses can have highly variable appearance. Seborrheic keratoses are extremely common. It has been estimated that over 90% of adults over the age of 61 years have one or more of them. They occur in males and females of all races, typically beginning to erupt in the 35s or 45s. They are uncommon under the age of 21 years. The precise cause of seborrhoeic keratoses is not known. Seborrhoeic keratoses are considered degenerative in nature. As time goes by, seborrheic keratoses tend to become more numerous. Some people inherit a tendency to develop a very large number of them; some people may have hundreds of them. There is no easy way to remove multiple lesions on a single occasion. Unless a specific lesion is "inflamed" and is causing pain or stinging/burning or is bleeding, most insurance companies do not authorize treatment. XEROSIS ("DRY SKIN")         Assessment and Plan:  Based on a thorough discussion of this condition and the management approach to it (including a comprehensive discussion of the known risks, side effects and potential benefits of treatment), the patient (family) agrees to implement the following specific plan:  Use moisturizer like Eucerin,Cerave or Aveeno Cream 3 times a day for the dry skin            Dry skin refers to skin that feels dry to touch. Dry skin has a dull surface with a rough, scaly quality. The skin is less pliable and cracked. When dryness is severe, the skin may become inflamed and fissured. Although any body site can be dry, dry skin tends to affect the shins more than any other site. Dry skin is lacking moisture in the outer horny cell layer (stratum corneum) and this results in cracks in the skin surface. Dry skin is also called xerosis, xeroderma or asteatosis (lack of fat). It can affect males and females of all ages. There is some racial variability in water and lipid content of the skin.   Dry skin that starts in early childhood may be one of about 20 types of ichthyosis (fish-scale skin). There is often a family history of dry skin. Dry skin is commonly seen in people with atopic dermatitis. Nearly everyone > 60 years has dry skin. Dry skin that begins later may be seen in people with certain diseases and conditions. Postmenopausal women  Hypothyroidism  Chronic renal disease   Malnutrition and weight loss   Subclinical dermatitis   Treatment with certain drugs such as oral retinoids, diuretics and epidermal growth factor receptor inhibitors      What is the treatment for dry skin? The mainstay of treatment of dry skin and ichthyosis is moisturisers/emollients. They should be applied liberally and often enough to:  Reduce itch   Improve the barrier function   Prevent entry of irritants, bacteria   Reduce transepidermal water loss. How can dry skin be prevented? Eliminate aggravating factors:  Reduce the frequency of bathing. A humidifier in winter and air conditioner in summer   Compare having a short shower with a prolonged soak in a bath. Use lukewarm, not hot, water. Replace standard soap with a substitute such as a synthetic detergent cleanser, water-miscible emollient, bath oil, anti-pruritic tar oil, colloidal oatmeal etc.   Apply an emollient liberally and often, particularly shortly after bathing, and when itchy. The drier the skin, the thicker this should be, especially on the hands. What is the outlook for dry skin? A tendency to dry skin may persist life-long, or it may improve once contributing factors are controlled. History of skin cancer, possibly bcc    Assessment and Plan:  Based on a thorough discussion of this condition and the management approach to it (including a comprehensive discussion of the known risks, side effects and potential benefits of treatment), the patient (family) agrees to implement the following specific plan:  Continue to monitor regular skin checks.

## 2023-10-13 ENCOUNTER — APPOINTMENT (OUTPATIENT)
Dept: LAB | Facility: MEDICAL CENTER | Age: 58
End: 2023-10-13
Payer: COMMERCIAL

## 2023-10-13 ENCOUNTER — RA CDI HCC (OUTPATIENT)
Dept: OTHER | Facility: HOSPITAL | Age: 58
End: 2023-10-13

## 2023-10-13 DIAGNOSIS — E78.2 MIXED HYPERLIPIDEMIA: ICD-10-CM

## 2023-10-13 LAB
ALBUMIN SERPL BCP-MCNC: 4.1 G/DL (ref 3.5–5)
ALP SERPL-CCNC: 82 U/L (ref 34–104)
ALT SERPL W P-5'-P-CCNC: 19 U/L (ref 7–52)
ANION GAP SERPL CALCULATED.3IONS-SCNC: 4 MMOL/L
AST SERPL W P-5'-P-CCNC: 20 U/L (ref 13–39)
BILIRUB SERPL-MCNC: 0.54 MG/DL (ref 0.2–1)
BUN SERPL-MCNC: 23 MG/DL (ref 5–25)
CALCIUM SERPL-MCNC: 9.2 MG/DL (ref 8.4–10.2)
CHLORIDE SERPL-SCNC: 109 MMOL/L (ref 96–108)
CHOLEST SERPL-MCNC: 228 MG/DL
CO2 SERPL-SCNC: 27 MMOL/L (ref 21–32)
CREAT SERPL-MCNC: 1.01 MG/DL (ref 0.6–1.3)
GFR SERPL CREATININE-BSD FRML MDRD: 82 ML/MIN/1.73SQ M
GLUCOSE P FAST SERPL-MCNC: 95 MG/DL (ref 65–99)
HDLC SERPL-MCNC: 39 MG/DL
LDLC SERPL CALC-MCNC: 170 MG/DL (ref 0–100)
POTASSIUM SERPL-SCNC: 4.9 MMOL/L (ref 3.5–5.3)
PROT SERPL-MCNC: 7 G/DL (ref 6.4–8.4)
SODIUM SERPL-SCNC: 140 MMOL/L (ref 135–147)
TRIGL SERPL-MCNC: 97 MG/DL
TSH SERPL DL<=0.05 MIU/L-ACNC: 1.69 UIU/ML (ref 0.45–4.5)

## 2023-10-13 PROCEDURE — 80053 COMPREHEN METABOLIC PANEL: CPT

## 2023-10-13 PROCEDURE — 84443 ASSAY THYROID STIM HORMONE: CPT

## 2023-10-13 PROCEDURE — 36415 COLL VENOUS BLD VENIPUNCTURE: CPT

## 2023-10-13 PROCEDURE — 80061 LIPID PANEL: CPT

## 2023-10-13 NOTE — PROGRESS NOTES
[address] [facility] coding opportunities       Chart reviewed, no opportunity found: CHART REVIEWED, NO OPPORTUNITY FOUND        Patients Insurance        Commercial Insurance: [address]

## 2023-10-20 ENCOUNTER — OFFICE VISIT (OUTPATIENT)
Dept: FAMILY MEDICINE CLINIC | Facility: CLINIC | Age: 58
End: 2023-10-20

## 2023-10-20 VITALS
OXYGEN SATURATION: 98 % | SYSTOLIC BLOOD PRESSURE: 124 MMHG | HEART RATE: 71 BPM | WEIGHT: 174 LBS | RESPIRATION RATE: 16 BRPM | DIASTOLIC BLOOD PRESSURE: 82 MMHG | TEMPERATURE: 98.8 F | HEIGHT: 70 IN | BODY MASS INDEX: 24.91 KG/M2

## 2023-10-20 DIAGNOSIS — N40.0 BENIGN PROSTATIC HYPERPLASIA WITHOUT LOWER URINARY TRACT SYMPTOMS: ICD-10-CM

## 2023-10-20 DIAGNOSIS — I10 PRIMARY HYPERTENSION: ICD-10-CM

## 2023-10-20 DIAGNOSIS — Z12.5 SCREENING FOR PROSTATE CANCER: ICD-10-CM

## 2023-10-20 DIAGNOSIS — E78.2 MIXED HYPERLIPIDEMIA: Primary | ICD-10-CM

## 2023-10-20 DIAGNOSIS — Z13.0 SCREENING FOR DEFICIENCY ANEMIA: ICD-10-CM

## 2023-10-20 DIAGNOSIS — Z13.1 SCREENING FOR DIABETES MELLITUS: ICD-10-CM

## 2023-10-20 RX ORDER — ROSUVASTATIN CALCIUM 10 MG/1
10 TABLET, COATED ORAL DAILY
Qty: 90 TABLET | Refills: 3 | Status: SHIPPED | OUTPATIENT
Start: 2023-10-20

## 2023-10-20 NOTE — PROGRESS NOTES
Name: Santana Phan      : 1965      MRN: [id number]  Encounter Provider: Luma Martin DO  Encounter Date: 10/20/2023   Encounter department: [address] [place], [place] [address]     1. Mixed hyperlipidemia  Assessment & Plan:  Lipids reviewed with patient. Total cholesterol elevated. LDL cholesterol 170 and HDL 39. We will start rosuvastatin 10 mg daily. Orders:  -     Comprehensive metabolic panel; Future; Expected date: 2024  -     Lipid Panel with Direct LDL reflex; Future; Expected date: 2024  -     TSH, 3rd generation with Free T4 reflex; Future; Expected date: 2024  -     rosuvastatin (CRESTOR) 10 MG tablet; Take 1 tablet (10 mg total) by mouth daily    2. Primary hypertension  Assessment & Plan:  Blood pressure well controlled on amlodipine/valsartan 5/160. Continue current dosage      3. Screening for prostate cancer  -     PSA, Total Screen; Future; Expected date: 2024    4. Screening for deficiency anemia  -     CBC and differential; Future; Expected date: 2024    5. Screening for diabetes mellitus  -     Hemoglobin A1C; Future; Expected date: 2024    6. Benign prostatic hyperplasia without lower urinary tract symptoms  -     Ambulatory Referral to Urology; Future           Subjective      Patient was seen in follow-up for high blood pressure and hyperlipidemia. No complaints. He remains on amlodipine/valsartan for his blood pressure. Sees cardiology at [facility] annually. Requests referral to urology for prostate issues. Review of Systems   Constitutional: Negative. Respiratory: Negative. Cardiovascular: Negative. Gastrointestinal: Negative. Genitourinary: Negative. Musculoskeletal: Negative. Psychiatric/Behavioral: Negative.          Current Outpatient Medications on File Prior to Visit   Medication Sig   • amLODIPine-valsartan (EXFORGE) 5-160 MG per tablet Take 1 tablet by mouth daily   • Ascorbic Acid (vitamin C) 100 MG tablet Take 100 mg by mouth daily   • loratadine (CLARITIN) 5 MG chewable tablet Chew 5 mg daily   • Multiple Vitamin (multivitamin) tablet Take 1 tablet by mouth daily   • Saw [place] 160 MG CAPS Take by mouth   • Turmeric 500 MG CAPS Take by mouth   • [DISCONTINUED] amLODIPine (NORVASC) 2.5 mg tablet Take 2.5 mg by mouth daily       Objective     /82 (BP Location: Left arm, Patient Position: Sitting, Cuff Size: Standard)   Pulse 71   Temp 98.8 °F (37.1 °C)   Resp 16   Ht 5' 10" (1.778 m)   Wt 78.9 kg (174 lb)   SpO2 98%   BMI 24.97 kg/m²     Physical Exam  Vitals and nursing note reviewed. Constitutional:       General: He is not in acute distress. Appearance: Normal appearance. He is well-developed. He is not diaphoretic. HENT:      Head: Normocephalic and atraumatic. Eyes:      General:         Right eye: No discharge. Conjunctiva/sclera: Conjunctivae normal.      Pupils: Pupils are equal, round, and reactive to light. Neck:      Thyroid: No thyromegaly. Cardiovascular:      Rate and Rhythm: Normal rate and regular rhythm. Pulmonary:      Effort: Pulmonary effort is normal. No respiratory distress. Breath sounds: Normal breath sounds. Musculoskeletal:      Cervical back: Normal range of motion. Lymphadenopathy:      Cervical: No cervical adenopathy. Skin:     General: Skin is warm and dry. Neurological:      Mental Status: He is alert and oriented to person, place, and time. Psychiatric:         Mood and Affect: Mood normal.         Behavior: Behavior normal.         Thought Content:  Thought content normal.         Judgment: Judgment normal.       Render Sharifar,

## 2023-10-20 NOTE — ASSESSMENT & PLAN NOTE
Lipids reviewed with patient. Total cholesterol elevated. LDL cholesterol 170 and HDL 39. We will start rosuvastatin 10 mg daily.

## 2023-11-17 DIAGNOSIS — E78.2 MIXED HYPERLIPIDEMIA: Primary | ICD-10-CM

## 2023-11-17 RX ORDER — SIMVASTATIN 10 MG
10 TABLET ORAL
Qty: 30 TABLET | Refills: 1 | Status: SHIPPED | OUTPATIENT
Start: 2023-11-17

## 2023-11-30 DIAGNOSIS — E78.2 MIXED HYPERLIPIDEMIA: Primary | ICD-10-CM

## 2023-11-30 RX ORDER — EZETIMIBE 10 MG/1
10 TABLET ORAL DAILY
Qty: 30 TABLET | Refills: 5 | Status: SHIPPED | OUTPATIENT
Start: 2023-11-30 | End: 2024-05-28

## 2024-01-04 DIAGNOSIS — I10 ESSENTIAL HYPERTENSION: ICD-10-CM

## 2024-01-04 RX ORDER — AMLODIPINE AND VALSARTAN 5; 160 MG/1; MG/1
1 TABLET ORAL DAILY
Qty: 90 TABLET | Refills: 1 | Status: SHIPPED | OUTPATIENT
Start: 2024-01-04

## 2024-01-23 ENCOUNTER — TELEPHONE (OUTPATIENT)
Dept: FAMILY MEDICINE CLINIC | Facility: CLINIC | Age: 59
End: 2024-01-23

## 2024-01-23 NOTE — TELEPHONE ENCOUNTER
[facility] notifying pt that their appointment on 4/26 is cancelled due to a change in Dr. Mixon's schedule. Asked pt to call back to reschedule. Pt not due for physical until after 4/7/24

## 2024-02-23 ENCOUNTER — TELEPHONE (OUTPATIENT)
Age: 59
End: 2024-02-23

## 2024-05-06 ENCOUNTER — HOSPITAL ENCOUNTER (EMERGENCY)
Facility: HOSPITAL | Age: 59
Discharge: HOME | End: 2024-05-06
Attending: EMERGENCY MEDICINE
Payer: COMMERCIAL

## 2024-05-06 ENCOUNTER — APPOINTMENT (EMERGENCY)
Dept: CT IMAGING | Facility: HOSPITAL | Age: 59
End: 2024-05-06
Payer: COMMERCIAL

## 2024-05-06 VITALS
OXYGEN SATURATION: 97 % | BODY MASS INDEX: 25.31 KG/M2 | TEMPERATURE: 98 F | RESPIRATION RATE: 18 BRPM | SYSTOLIC BLOOD PRESSURE: 130 MMHG | WEIGHT: 176.37 LBS | HEART RATE: 75 BPM | DIASTOLIC BLOOD PRESSURE: 84 MMHG

## 2024-05-06 DIAGNOSIS — K57.90 DIVERTICULOSIS: ICD-10-CM

## 2024-05-06 DIAGNOSIS — R10.9 ABDOMINAL PAIN: Primary | ICD-10-CM

## 2024-05-06 DIAGNOSIS — K76.89 LIVER CYST: ICD-10-CM

## 2024-05-06 LAB
ALBUMIN SERPL BCP-MCNC: 4.4 G/DL (ref 3.5–5)
ALP SERPL-CCNC: 71 U/L (ref 34–104)
ALT SERPL W P-5'-P-CCNC: 18 U/L (ref 7–52)
ANION GAP SERPL CALCULATED.3IONS-SCNC: 7 MMOL/L (ref 4–13)
AST SERPL W P-5'-P-CCNC: 22 U/L (ref 13–39)
BASOPHILS # BLD AUTO: 0.03 THOUSANDS/ÂΜL (ref 0–0.1)
BASOPHILS NFR BLD AUTO: 0 % (ref 0–1)
BILIRUB SERPL-MCNC: 0.56 MG/DL (ref 0.2–1)
BILIRUB UR QL STRIP: NEGATIVE
BUN SERPL-MCNC: 15 MG/DL (ref 5–25)
CALCIUM SERPL-MCNC: 9.3 MG/DL (ref 8.4–10.2)
CHLORIDE SERPL-SCNC: 106 MMOL/L (ref 96–108)
CLARITY UR: CLEAR
CO2 SERPL-SCNC: 26 MMOL/L (ref 21–32)
COLOR UR: YELLOW
CREAT SERPL-MCNC: 0.98 MG/DL (ref 0.6–1.3)
EOSINOPHIL # BLD AUTO: 0.05 THOUSAND/ÂΜL (ref 0–0.61)
EOSINOPHIL NFR BLD AUTO: 1 % (ref 0–6)
ERYTHROCYTE [DISTWIDTH] IN BLOOD BY AUTOMATED COUNT: 12.9 % (ref 11.6–15.1)
GFR SERPL CREATININE-BSD FRML MDRD: 85 ML/MIN/1.73SQ M
GLUCOSE SERPL-MCNC: 88 MG/DL (ref 65–140)
GLUCOSE UR STRIP-MCNC: NEGATIVE MG/DL
HCT VFR BLD AUTO: 44 % (ref 36.5–49.3)
HGB BLD-MCNC: 14.8 G/DL (ref 12–17)
HGB UR QL STRIP.AUTO: NEGATIVE
IMM GRANULOCYTES # BLD AUTO: 0.03 THOUSAND/UL (ref 0–0.2)
IMM GRANULOCYTES NFR BLD AUTO: 0 % (ref 0–2)
KETONES UR STRIP-MCNC: NEGATIVE MG/DL
LACTATE SERPL-SCNC: 0.5 MMOL/L (ref 0.5–2)
LEUKOCYTE ESTERASE UR QL STRIP: NEGATIVE
LIPASE SERPL-CCNC: 9 U/L (ref 11–82)
LYMPHOCYTES # BLD AUTO: 1.44 THOUSANDS/ÂΜL (ref 0.6–4.47)
LYMPHOCYTES NFR BLD AUTO: 18 % (ref 14–44)
MCH RBC QN AUTO: 29.5 PG (ref 26.8–34.3)
MCHC RBC AUTO-ENTMCNC: 33.6 G/DL (ref 31.4–37.4)
MCV RBC AUTO: 88 FL (ref 82–98)
MONOCYTES # BLD AUTO: 0.55 THOUSAND/ÂΜL (ref 0.17–1.22)
MONOCYTES NFR BLD AUTO: 7 % (ref 4–12)
NEUTROPHILS # BLD AUTO: 6.14 THOUSANDS/ÂΜL (ref 1.85–7.62)
NEUTS SEG NFR BLD AUTO: 74 % (ref 43–75)
NITRITE UR QL STRIP: NEGATIVE
NRBC BLD AUTO-RTO: 0 /100 WBCS
PH UR STRIP.AUTO: 7.5 [PH] (ref 4.5–8)
PLATELET # BLD AUTO: 253 THOUSANDS/UL (ref 149–390)
PMV BLD AUTO: 11.2 FL (ref 8.9–12.7)
POTASSIUM SERPL-SCNC: 4.3 MMOL/L (ref 3.5–5.3)
PROT SERPL-MCNC: 7.1 G/DL (ref 6.4–8.4)
PROT UR STRIP-MCNC: NEGATIVE MG/DL
RBC # BLD AUTO: 5.02 MILLION/UL (ref 3.88–5.62)
SODIUM SERPL-SCNC: 139 MMOL/L (ref 135–147)
SP GR UR STRIP.AUTO: 1.01 (ref 1–1.03)
UROBILINOGEN UR QL STRIP.AUTO: 0.2 E.U./DL
WBC # BLD AUTO: 8.24 THOUSAND/UL (ref 4.31–10.16)

## 2024-05-06 PROCEDURE — 96361 HYDRATE IV INFUSION ADD-ON: CPT

## 2024-05-06 PROCEDURE — 36415 COLL VENOUS BLD VENIPUNCTURE: CPT | Performed by: EMERGENCY MEDICINE

## 2024-05-06 PROCEDURE — 83690 ASSAY OF LIPASE: CPT | Performed by: EMERGENCY MEDICINE

## 2024-05-06 PROCEDURE — 96360 HYDRATION IV INFUSION INIT: CPT

## 2024-05-06 PROCEDURE — 83605 ASSAY OF LACTIC ACID: CPT | Performed by: EMERGENCY MEDICINE

## 2024-05-06 PROCEDURE — 99285 EMERGENCY DEPT VISIT HI MDM: CPT | Performed by: EMERGENCY MEDICINE

## 2024-05-06 PROCEDURE — 99284 EMERGENCY DEPT VISIT MOD MDM: CPT

## 2024-05-06 PROCEDURE — 81003 URINALYSIS AUTO W/O SCOPE: CPT

## 2024-05-06 PROCEDURE — 85025 COMPLETE CBC W/AUTO DIFF WBC: CPT | Performed by: EMERGENCY MEDICINE

## 2024-05-06 PROCEDURE — 74177 CT ABD & PELVIS W/CONTRAST: CPT

## 2024-05-06 PROCEDURE — 80053 COMPREHEN METABOLIC PANEL: CPT | Performed by: EMERGENCY MEDICINE

## 2024-05-06 RX ORDER — DICYCLOMINE HCL 20 MG
20 TABLET ORAL 2 TIMES DAILY PRN
Qty: 4 TABLET | Refills: 0 | Status: SHIPPED | OUTPATIENT
Start: 2024-05-06 | End: 2024-05-08

## 2024-05-06 RX ORDER — DICYCLOMINE HCL 20 MG
20 TABLET ORAL ONCE
Status: COMPLETED | OUTPATIENT
Start: 2024-05-06 | End: 2024-05-06

## 2024-05-06 RX ADMIN — SODIUM CHLORIDE 1000 ML: 0.9 INJECTION, SOLUTION INTRAVENOUS at 16:19

## 2024-05-06 RX ADMIN — IOHEXOL 100 ML: 350 INJECTION, SOLUTION INTRAVENOUS at 17:00

## 2024-05-06 RX ADMIN — DICYCLOMINE HYDROCHLORIDE 20 MG: 20 TABLET ORAL at 18:18

## 2024-05-06 NOTE — ED PROVIDER NOTES
History  Chief Complaint   Patient presents with    Abdominal Pain     Abd pain and cramping started yesterday, almost felt like he needed to have bowel movement. Today pain is underneath belly button, went to Urgent Care, and was told to come here to r/o appy or diverticulitis.      Patient is a 58-year-old male with a history of hypertension, hyperlipidemia, BPH, A-fib status post ablation in April 2021 coming in today complaining of abdominal pain.  Patient reports that several weeks ago he started taking medication in order to assist helping him lose weight.  He reports that he stopped this most recently and his symptoms had worsened..  He was having intermittent abdominal cramping but significantly worsened over the past several days.  He reports that his cramping with intermittent sharp pain and nausea.  It is primarily to the lower abdomen and he thought maybe it was a urine infection so he went to urgent care.  This was in [place].  He states that they suggested that he comes to the ER.  He does report that the pain kind of fluctuates from the right side to the middle to the left side of his abdomen.  He has no back pain and the pain does not radiate into the back.  He has no vomiting, diarrhea, melena, bright red blood per rectum.  He has no recent surgeries, recent illnesses or sick contacts.  He did have a colonoscopy last year which he stated had a polyp removed.  He reports that sometimes the pain is worsened with eating and sometimes not. No urinary signs or symptoms.  No radiation of pain into the testicles or groin.    He does report in general that if he eats Pasta or has beer he does feel like he gets bloated.  Patient's last colonoscopy was 1 year ago.      History provided by:  Patient and medical records   used: No    Abdominal Cramping  Pain location:  LLQ, RLQ and periumbilical  Pain quality: aching, cramping, sharp and shooting    Pain radiates to:  Does not  radiate  Pain severity:  Moderate  Timing:  Constant  Progression:  Waxing and waning  Chronicity:  New  Context: not alcohol use, not awakening from sleep, not diet changes, not eating, not laxative use, not medication withdrawal, not previous surgeries, not recent illness, not recent sexual activity, not recent travel, not retching, not sick contacts, not suspicious food intake and not trauma    Relieved by:  Nothing  Worsened by:  Nothing  Ineffective treatments:  None tried  Associated symptoms: nausea    Associated symptoms: no anorexia, no belching, no chest pain, no chills, no constipation, no cough, no diarrhea, no dysuria, no fatigue, no fever, no flatus, no hematemesis, no hematochezia, no hematuria, no melena, no shortness of breath, no sore throat and no vomiting    Risk factors: no alcohol abuse, no aspirin use, not elderly, has not had multiple surgeries, no NSAID use, not obese and no recent hospitalization          Prior to Admission Medications   Prescriptions Last Dose Informant Patient Reported? Taking?   Ascorbic Acid (vitamin C) 100 MG tablet   Yes No   Sig: Take 100 mg by mouth daily   Multiple Vitamin (multivitamin) tablet   Yes No   Sig: Take 1 tablet by mouth daily   Saw [place] 160 MG CAPS   Yes No   Sig: Take by mouth   Turmeric 500 MG CAPS   Yes No   Sig: Take by mouth   amLODIPine-valsartan (EXFORGE) 5-160 MG per tablet   No No   Sig: Take 1 tablet by mouth daily   ezetimibe (ZETIA) 10 mg tablet   No No   Sig: Take 1 tablet (10 mg total) by mouth daily   loratadine (CLARITIN) 5 MG chewable tablet   Yes No   Sig: Chew 5 mg daily      Facility-Administered Medications: None       Past Medical History:   Diagnosis Date    A-fib (HCC)     Allergic 2020    Allergic to the sun.    Arthritis 2012    Hip    Basal cell carcinoma 2010    [place], [place]    Ear problems 2004    excessive wax    Hypertension     [place] Childhood       Past Surgical History:   Procedure Laterality Date    CARDIAC  ELECTROPHYSIOLOGY MAPPING AND ABLATION      COLONOSCOPY      HERNIA REPAIR      SKIN BIOPSY  2010    [place], [place]    VASECTOMY         Family History   Problem Relation Age of Onset    Cancer Mother         Lung- smoking    Hypertension Mother         High blood pressure    Clotting disorder Father         Had a stroke on blood thinners    Diabetes Father         After his heart attack    Cancer Father         Lung - smoking    Stroke Father         Had multiple minor strokes    Arthritis Brother         2 hip replacements    Arthritis Sister         Hip and knee replacement.    Depression Sister         Psychiatric disorders     I have reviewed and agree with the history as documented.    E-Cigarette/Vaping    E-Cigarette Use Never User      E-Cigarette/Vaping Substances    Nicotine No     THC No     CBD No     Flavoring No     Other No     Unknown No      Social History     Tobacco Use    Smoking status: Never    Smokeless tobacco: Never    Tobacco comments:     None   Vaping Use    Vaping status: Never Used   Substance Use Topics    Alcohol use: Yes     Alcohol/week: 3.0 standard drinks of alcohol     Types: 3 Standard drinks or equivalent per week     Comment: occasional    Drug use: Never       Review of Systems   Constitutional: Negative.  Negative for chills, fatigue and fever.   HENT: Negative.  Negative for ear pain and sore throat.    Eyes: Negative.  Negative for pain and visual disturbance.   Respiratory: Negative.  Negative for cough and shortness of breath.    Cardiovascular: Negative.  Negative for chest pain and palpitations.   Gastrointestinal:  Positive for abdominal pain and nausea. Negative for anorexia, constipation, diarrhea, flatus, hematemesis, hematochezia, melena and vomiting.   Genitourinary: Negative.  Negative for dysuria and hematuria.   Musculoskeletal: Negative.  Negative for arthralgias and back pain.   Skin:  Negative for color change and rash.   Neurological:  Negative for seizures  and syncope.   Hematological: Negative.    Psychiatric/Behavioral: Negative.     All other systems reviewed and are negative.    Physical Exam  Physical Exam  Vitals and nursing note reviewed.   Constitutional:       General: He is not in acute distress.     Appearance: He is well-developed.   HENT:      Head: Normocephalic and atraumatic.      Comments: Patient maintaining airway and secretions. No stridor . No brawniness under tongue.       Eyes:      Extraocular Movements: Extraocular movements intact.      Conjunctiva/sclera: Conjunctivae normal.      Pupils: Pupils are equal, round, and reactive to light.   Cardiovascular:      Rate and Rhythm: Normal rate and regular rhythm.      Heart sounds: Normal heart sounds, S1 normal and S2 normal. No murmur heard.  Pulmonary:      Effort: Pulmonary effort is normal. No respiratory distress.      Breath sounds: Normal breath sounds.   Abdominal:      General: Bowel sounds are normal.      Palpations: Abdomen is soft.      Tenderness: There is abdominal tenderness in the right lower quadrant, periumbilical area, suprapubic area and left lower quadrant. There is no right CVA tenderness, left CVA tenderness, guarding or rebound. Negative signs include Magaña's sign, Rovsing's sign and McBurney's sign.   Musculoskeletal:         General: No swelling.      Cervical back: Neck supple.      Right lower leg: No edema.      Left lower leg: No edema.   Skin:     General: Skin is warm and dry.      Capillary Refill: Capillary refill takes less than 2 seconds.   Neurological:      General: No focal deficit present.      Mental Status: He is alert and oriented to person, place, and time.      GCS: GCS eye subscore is 4. GCS verbal subscore is 5. GCS motor subscore is 6.      Cranial Nerves: Cranial nerves 2-12 are intact.      Sensory: Sensation is intact.      Motor: Motor function is intact.      Coordination: Coordination is intact.   Psychiatric:         Mood and Affect: Mood  normal.         Vital Signs  ED Triage Vitals [05/06/24 1541]   Temperature Pulse Respirations Blood Pressure SpO2   98 °F (36.7 °C) 82 18 129/70 99 %      Temp Source Heart Rate Source Patient Position - Orthostatic VS BP Location FiO2 (%)   Oral Monitor Sitting Right arm --      Pain Score       4           Vitals:    05/06/24 1541 05/06/24 1821   BP: 129/70 130/84   Pulse: 82 75   Patient Position - Orthostatic VS: Sitting          Visual Acuity      ED Medications  Medications   sodium chloride 0.9 % bolus 1,000 mL (0 mL Intravenous Stopped 5/6/24 1817)   iohexol (OMNIPAQUE) 350 MG/ML injection (MULTI-DOSE) 100 mL (100 mL Intravenous Given 5/6/24 1700)   dicyclomine (BENTYL) tablet 20 mg (20 mg Oral Given 5/6/24 1818)       Diagnostic Studies  Results Reviewed       Procedure Component Value Units Date/Time    Comprehensive metabolic panel [[id number]] Collected: 05/06/24 1619    Lab Status: Final result Specimen: Blood from Arm, Right Updated: 05/06/24 1643     Sodium 139 mmol/L      Potassium 4.3 mmol/L      Chloride 106 mmol/L      CO2 26 mmol/L      ANION GAP 7 mmol/L      BUN 15 mg/dL      Creatinine 0.98 mg/dL      Glucose 88 mg/dL      Calcium 9.3 mg/dL      AST 22 U/L      ALT 18 U/L      Alkaline Phosphatase 71 U/L      Total Protein 7.1 g/dL      Albumin 4.4 g/dL      Total Bilirubin 0.56 mg/dL      eGFR 85 ml/min/1.73sq m     Narrative:      National Kidney Disease Foundation guidelines for Chronic Kidney Disease (CKD):     Stage 1 with normal or high GFR (GFR > 90 mL/min/1.73 square meters)    Stage 2 Mild CKD (GFR = 60-89 mL/min/1.73 square meters)    Stage 3A Moderate CKD (GFR = 45-59 mL/min/1.73 square meters)    Stage 3B Moderate CKD (GFR = 30-44 mL/min/1.73 square meters)    Stage 4 Severe CKD (GFR = 15-29 mL/min/1.73 square meters)    Stage 5 End Stage CKD (GFR <15 mL/min/1.73 square meters)  Note: GFR calculation is accurate only with a steady state creatinine    Lipase [[id number]]  (Abnormal)  Collected: 05/06/24 1619    Lab Status: Final result Specimen: Blood from Arm, Right Updated: 05/06/24 1643     Lipase 9 u/L     Lactic acid, plasma (w/reflex if result > 2.0) [[id number]]  (Normal) Collected: 05/06/24 1619    Lab Status: Final result Specimen: Blood from Arm, Right Updated: 05/06/24 1643     LACTIC ACID 0.5 mmol/L     Narrative:      Result may be elevated if tourniquet was used during collection.    Urine Macroscopic, POC [[id number]] Collected: 05/06/24 1626    Lab Status: Final result Specimen: Urine Updated: 05/06/24 1627     Color, UA Yellow     Clarity, UA Clear     pH, UA 7.5     Leukocytes, UA Negative     Nitrite, UA Negative     Protein, UA Negative mg/dl      Glucose, UA Negative mg/dl      Ketones, UA Negative mg/dl      Urobilinogen, UA 0.2 E.U./dl      Bilirubin, UA Negative     Occult Blood, UA Negative     Specific Gravity, UA 1.015    Narrative:      CLINITEK RESULT    CBC and differential [[id number]] Collected: 05/06/24 1619    Lab Status: Final result Specimen: Blood from Arm, Right Updated: 05/06/24 1626     WBC 8.24 Thousand/uL      RBC 5.02 Million/uL      Hemoglobin 14.8 g/dL      Hematocrit 44.0 %      MCV 88 fL      MCH 29.5 pg      MCHC 33.6 g/dL      RDW 12.9 %      MPV 11.2 fL      Platelets 253 Thousands/uL      nRBC 0 /100 WBCs      Segmented % 74 %      Immature Grans % 0 %      Lymphocytes % 18 %      Monocytes % 7 %      Eosinophils Relative 1 %      Basophils Relative 0 %      Absolute Neutrophils 6.14 Thousands/µL      Absolute Immature Grans 0.03 Thousand/uL      Absolute Lymphocytes 1.44 Thousands/µL      Absolute Monocytes 0.55 Thousand/µL      Eosinophils Absolute 0.05 Thousand/µL      Basophils Absolute 0.03 Thousands/µL                    CT abdomen pelvis with contrast   Final Result by Paul Tavarez MD (05/06 1755)      No acute findings in the abdomen or pelvis.         Workstation performed: [id number]               "      Procedures  Procedures         ED Course  ED Course as of 05/06/24 1931   Mon May 06, 2024   1604 Patient is a 58 year old male coming in with persistent abdominal pain. On exam, patient is well appearing in no distress and non peritoneal. Will obtain labs, CT and Urine.       Disclosure: Voice to text software was used in the preparation of this document and could have resulted in translational errors.      Occasional wrong word or \"sound a like\" substitutions may have occurred due to the inherent limitations of voice recognition software.  Read the chart carefully and recognize, using context, where substitutions have occurred.       I have independently reviewed external records are available to me to the level of detail possible within the time constraints of my patient care responsibilities in the ED.       1657 Labs reviewed and without actionable derangement     1808 CT without acute findings.     Long discussion with with patient regarding need for follow-up with gastroenterology, diet as well as keeping a diary.  Will give Bentyl and plan for DC home.  Also aware degenerative changes and cystic lesion and liver.  On exam patient again is nonperitoneal and well-appearing.  Agrees with plan      Counseling: I had a detailed discussion with the patient and/or guardian regarding: the historical points, exam findings, and any diagnostic results supporting the discharge diagnosis, lab results, radiology results, discharge instructions reviewed with patient and/or family/caregiver and understanding was verbalized. Instructions given to return to the emergency department if symptoms worsen or persist, or if there are any questions or concerns that arise at home.     All imaging and/or lab testing discussed with patient, strict return to ED precautions discussed. Patient recommended to follow up promptly with appropriate outpatient provider. Patient and/or family members verbalizes understanding and agrees " with plan. Patient and/or family members were given opportunity to ask questions, all questions were answered at this time. Patient is stable for discharge                                                   Medical Decision Making  Differential diagnosis includes but not limited to:  Appendicitis, viral syndrome, constipation, AMI, NSTEMI, pneumonia, pneuothorax, gerd, gastritis,  mesenteric ischemia, mesenteric adenitis, pancreatitis, cholecystitis, choledocholithiasis, hepatitis, bowel obstruction, ileus, gastroenteritis, colitis, malignancy, AAA, perforation, toxicologic poisoning, renal infarct, acute kidney injury, splenic infarct, splenic injury, nephrolithiasis, UTI, muscular strain, intra-abdominal hematoma, hernia, testicular torsion, epididymitis, varicocele, hydrocele, phimosis, paraphimosis, balanitis proctitis, rectal pain, rectal prolapse, hemorrhoids, perirectal abscess, anorectal fistula       Based on history and physical concerning for appendicitis versus pancreatitis versus gastroenteritis versus ileus versus diverticulitis versus obstipation gastritis versus etiology concerning from a irritable bowel syndrome    Problems Addressed:  Abdominal pain: acute illness or injury  Diverticulosis: chronic illness or injury  Liver cyst: chronic illness or injury    Amount and/or Complexity of Data Reviewed  External Data Reviewed: notes.     Details: Patient's colonoscopy per documentation performed on June 16, 2023 showed normal terminal ileum with moderate diverticulosis as well as noted hemorrhoids.  There was a polyp in the ascending colon removed    Labs: ordered. Decision-making details documented in ED Course.     Details: No anemia, thrombocytopenia or leukocytosis  No acute kidney injury or electrolyte dysfunction  Urine clear  Lactic acid within normal range  Radiology: ordered. Decision-making details documented in ED Course.     Details: CT abdomen pelvis interpreted by radiologist as no acute  findings    Risk  Prescription drug management.             Disposition  Final diagnoses:   Abdominal pain   Diverticulosis   Liver cyst     Time reflects when diagnosis was documented in both MDM as applicable and the Disposition within this note       Time User Action Codes Description Comment    5/6/2024  6:09 PM Emmy Ramon Add [R10.9] Abdominal pain     5/6/2024  6:09 PM Emmy Ramon Add [K57.90] Diverticulosis     5/6/2024  6:09 PM Emmy Ramon Add [K76.89] Liver cyst           ED Disposition       ED Disposition   Discharge    Condition   Stable    Date/Time   Mon May 6, 2024  6:09 PM    Comment   Floyd Owen discharge to home/self care.                   Follow-up Information       Follow up With Specialties Details Why Contact Info Additional Information    Jose F Mixon,  Family Medicine Schedule an appointment as soon as possible for a visit in 1 week  [address]  [address]  [facility] [address]  [phone]       [facility]   [address]  [address]  [facility] [address]  [phone] [facility] [place] [address], [address], [place], [place], [address]   [phone]            Discharge Medication List as of 5/6/2024  6:10 PM        START taking these medications    Details   dicyclomine (BENTYL) 20 mg tablet Take 1 tablet (20 mg total) by mouth 2 (two) times a day as needed (abd pain) for up to 2 days, Starting Mon 5/6/2024, Until Wed 5/8/2024 at 2359, Normal           CONTINUE these medications which have NOT CHANGED    Details   amLODIPine-valsartan (EXFORGE) 5-160 MG per tablet Take 1 tablet by mouth daily, Starting Thu 1/4/2024, Normal      Ascorbic Acid (vitamin C) 100 MG tablet Take 100 mg by mouth daily, Historical Med      ezetimibe (ZETIA) 10 mg tablet Take 1 tablet (10 mg total) by mouth daily, Starting Thu 11/30/2023, Until Tue 5/28/2024, Normal       loratadine (CLARITIN) 5 MG chewable tablet Chew 5 mg daily, Historical Med      Multiple Vitamin (multivitamin) tablet Take 1 tablet by mouth daily, Historical Med      Saw [place] 160 MG CAPS Take by mouth, Historical Med      Turmeric 500 MG CAPS Take by mouth, Historical Med             No discharge procedures on file.    PDMP Review       None            ED Provider  Electronically Signed by             Emmy Ramon DO  05/06/24 0587

## 2024-05-06 NOTE — DISCHARGE INSTRUCTIONS
As discussed, please follow-up with gastroenterology      Please keep a diet and try to avoid products containing wheat such as beer and Pssta as this may increase bloating and pain    You have a cyst on your liver and degeneration in her lumbar spine and bilateral hips

## 2024-05-21 DIAGNOSIS — R19.4 CHANGE IN BOWEL HABIT: Primary | ICD-10-CM

## 2024-05-23 ENCOUNTER — RA CDI HCC (OUTPATIENT)
Dept: OTHER | Facility: HOSPITAL | Age: 59
End: 2024-05-23

## 2024-05-23 ENCOUNTER — APPOINTMENT (OUTPATIENT)
Dept: LAB | Facility: CLINIC | Age: 59
End: 2024-05-23

## 2024-05-23 NOTE — PROGRESS NOTES
HCC coding opportunities       Chart reviewed, no opportunity found: CHART REVIEWED, NO OPPORTUNITY FOUND        Patients Insurance        Commercial Insurance: [employer]

## 2024-05-24 ENCOUNTER — APPOINTMENT (OUTPATIENT)
Dept: LAB | Facility: CLINIC | Age: 59
End: 2024-05-24
Payer: COMMERCIAL

## 2024-05-24 DIAGNOSIS — Z13.0 SCREENING FOR DEFICIENCY ANEMIA: ICD-10-CM

## 2024-05-24 DIAGNOSIS — E78.2 MIXED HYPERLIPIDEMIA: ICD-10-CM

## 2024-05-24 DIAGNOSIS — Z12.5 SCREENING FOR PROSTATE CANCER: ICD-10-CM

## 2024-05-24 DIAGNOSIS — R19.4 CHANGE IN BOWEL HABIT: ICD-10-CM

## 2024-05-24 DIAGNOSIS — Z13.1 SCREENING FOR DIABETES MELLITUS: ICD-10-CM

## 2024-05-24 LAB
ALBUMIN SERPL BCP-MCNC: 4.2 G/DL (ref 3.5–5)
ALP SERPL-CCNC: 78 U/L (ref 34–104)
ALT SERPL W P-5'-P-CCNC: 19 U/L (ref 7–52)
ANION GAP SERPL CALCULATED.3IONS-SCNC: 9 MMOL/L (ref 4–13)
AST SERPL W P-5'-P-CCNC: 20 U/L (ref 13–39)
BASOPHILS # BLD AUTO: 0.04 THOUSANDS/ÂΜL (ref 0–0.1)
BASOPHILS NFR BLD AUTO: 1 % (ref 0–1)
BILIRUB SERPL-MCNC: 0.35 MG/DL (ref 0.2–1)
BUN SERPL-MCNC: 16 MG/DL (ref 5–25)
CALCIUM SERPL-MCNC: 9 MG/DL (ref 8.4–10.2)
CHLORIDE SERPL-SCNC: 106 MMOL/L (ref 96–108)
CHOLEST SERPL-MCNC: 179 MG/DL
CO2 SERPL-SCNC: 25 MMOL/L (ref 21–32)
CREAT SERPL-MCNC: 0.98 MG/DL (ref 0.6–1.3)
EOSINOPHIL # BLD AUTO: 0.16 THOUSAND/ÂΜL (ref 0–0.61)
EOSINOPHIL NFR BLD AUTO: 3 % (ref 0–6)
ERYTHROCYTE [DISTWIDTH] IN BLOOD BY AUTOMATED COUNT: 12.4 % (ref 11.6–15.1)
EST. AVERAGE GLUCOSE BLD GHB EST-MCNC: 111 MG/DL
GFR SERPL CREATININE-BSD FRML MDRD: 85 ML/MIN/1.73SQ M
GLUCOSE P FAST SERPL-MCNC: 97 MG/DL (ref 65–99)
HBA1C MFR BLD: 5.5 %
HCT VFR BLD AUTO: 46.5 % (ref 36.5–49.3)
HDLC SERPL-MCNC: 33 MG/DL
HGB BLD-MCNC: 15.4 G/DL (ref 12–17)
IMM GRANULOCYTES # BLD AUTO: 0.02 THOUSAND/UL (ref 0–0.2)
IMM GRANULOCYTES NFR BLD AUTO: 0 % (ref 0–2)
LDLC SERPL CALC-MCNC: 123 MG/DL (ref 0–100)
LYMPHOCYTES # BLD AUTO: 1.63 THOUSANDS/ÂΜL (ref 0.6–4.47)
LYMPHOCYTES NFR BLD AUTO: 27 % (ref 14–44)
MCH RBC QN AUTO: 29.1 PG (ref 26.8–34.3)
MCHC RBC AUTO-ENTMCNC: 33.1 G/DL (ref 31.4–37.4)
MCV RBC AUTO: 88 FL (ref 82–98)
MONOCYTES # BLD AUTO: 0.46 THOUSAND/ÂΜL (ref 0.17–1.22)
MONOCYTES NFR BLD AUTO: 8 % (ref 4–12)
NEUTROPHILS # BLD AUTO: 3.79 THOUSANDS/ÂΜL (ref 1.85–7.62)
NEUTS SEG NFR BLD AUTO: 61 % (ref 43–75)
NRBC BLD AUTO-RTO: 0 /100 WBCS
PLATELET # BLD AUTO: 297 THOUSANDS/UL (ref 149–390)
PMV BLD AUTO: 10.8 FL (ref 8.9–12.7)
POTASSIUM SERPL-SCNC: 4.6 MMOL/L (ref 3.5–5.3)
PROT SERPL-MCNC: 7.1 G/DL (ref 6.4–8.4)
PSA SERPL-MCNC: 0.57 NG/ML (ref 0–4)
RBC # BLD AUTO: 5.3 MILLION/UL (ref 3.88–5.62)
SODIUM SERPL-SCNC: 140 MMOL/L (ref 135–147)
TRIGL SERPL-MCNC: 115 MG/DL
TSH SERPL DL<=0.05 MIU/L-ACNC: 1.89 UIU/ML (ref 0.45–4.5)
WBC # BLD AUTO: 6.1 THOUSAND/UL (ref 4.31–10.16)

## 2024-05-24 PROCEDURE — G0103 PSA SCREENING: HCPCS

## 2024-05-24 PROCEDURE — 87177 OVA AND PARASITES SMEARS: CPT

## 2024-05-24 PROCEDURE — 80053 COMPREHEN METABOLIC PANEL: CPT

## 2024-05-24 PROCEDURE — 83036 HEMOGLOBIN GLYCOSYLATED A1C: CPT

## 2024-05-24 PROCEDURE — 87209 SMEAR COMPLEX STAIN: CPT

## 2024-05-24 PROCEDURE — 85025 COMPLETE CBC W/AUTO DIFF WBC: CPT

## 2024-05-24 PROCEDURE — 84443 ASSAY THYROID STIM HORMONE: CPT

## 2024-05-24 PROCEDURE — 36415 COLL VENOUS BLD VENIPUNCTURE: CPT

## 2024-05-24 PROCEDURE — 80061 LIPID PANEL: CPT

## 2024-05-28 DIAGNOSIS — E78.2 MIXED HYPERLIPIDEMIA: ICD-10-CM

## 2024-05-29 RX ORDER — EZETIMIBE 10 MG/1
10 TABLET ORAL DAILY
Qty: 30 TABLET | Refills: 5 | Status: SHIPPED | OUTPATIENT
Start: 2024-05-29 | End: 2024-05-31 | Stop reason: SDUPTHER

## 2024-05-30 ENCOUNTER — OFFICE VISIT (OUTPATIENT)
Dept: GASTROENTEROLOGY | Facility: CLINIC | Age: 59
End: 2024-05-30
Payer: COMMERCIAL

## 2024-05-30 VITALS
HEART RATE: 76 BPM | HEIGHT: 70 IN | SYSTOLIC BLOOD PRESSURE: 112 MMHG | WEIGHT: 175 LBS | OXYGEN SATURATION: 98 % | DIASTOLIC BLOOD PRESSURE: 71 MMHG | TEMPERATURE: 96.6 F | BODY MASS INDEX: 25.05 KG/M2

## 2024-05-30 DIAGNOSIS — R14.0 ABDOMINAL BLOATING: ICD-10-CM

## 2024-05-30 DIAGNOSIS — R10.30 LOWER ABDOMINAL PAIN: Primary | ICD-10-CM

## 2024-05-30 DIAGNOSIS — K59.00 CONSTIPATION, UNSPECIFIED CONSTIPATION TYPE: ICD-10-CM

## 2024-05-30 PROCEDURE — 99204 OFFICE O/P NEW MOD 45 MIN: CPT | Performed by: DIETITIAN, REGISTERED

## 2024-05-30 RX ORDER — SIMETHICONE 180 MG
180 CAPSULE ORAL 3 TIMES DAILY PRN
Qty: 90 CAPSULE | Refills: 3 | Status: SHIPPED | OUTPATIENT
Start: 2024-05-30

## 2024-05-30 NOTE — PATIENT INSTRUCTIONS
If you would like to try probiotics, recommend Culturelle, Align, or VSL-#3.  Make sure the brand you buy requires refrigeration.

## 2024-05-30 NOTE — PROGRESS NOTES
"[facility] - Outpatient Follow-up Note  Floyd Mosquedamoe 58 y.o. male MRN: [id number]  Encounter: [id number]          ASSESSMENT AND PLAN:    1.  Lower abdominal pain  2.  Abdominal bloating  3.  Constipation  Patient reports last year after his colonoscopy he had some mild abdominal cramping for a few weeks/months, and the symptoms eventually resolved.  He then tried some weight loss supplements such as keto gummies and a supplement called  \"V-Shred.\"  After he discontinued the supplements, over the last few weeks, his pain has recurred.  He has intermittent suprapubic pain which sometimes radiates to the LLQ.  This pain is described as pinching/tugging.  He had an abdominal hernia repair with mesh in 1998.  He also describes stools are small and he has less frequent BMs than baseline, but denies any severe constipation symptoms.  He has noticed that eating more salt makes his symptoms worse.  At times he is having nausea.  Colonoscopy 6/16/2023 with normal terminal ileum, 1 subcentimeter tubular adenoma removed, moderate pancolonic diverticulosis most significant in the sigmoid colon noted, and internal hemorrhoids noted.  Recommended for repeat colonoscopy in 7 years.    -Discussed with patient that his lower abdominal pain is likely due to gas pains in conjunction with possible surgical adhesions from his prior abdominal wall hernia repair.  -Recommend increasing water intake, goal of 64 ounces daily.  -Increase fiber intake as able, utilizing foods such as fruits, vegetables, nuts, seeds, whole grains.  -Patient is interested in trying probiotics.  We discussed that research is not very strong regarding the efficacy of probiotics, but they are safe to try.  Recommend brands such as Culturelle, align, or VSL #3.  -Start simethicone 180 mg 3 times daily.      Follow up 2 months.    __________________________________________________________    SUBJECTIVE:  Floyd Owen is " "a 58 y.o. male with history of hypertension, hyperlipidemia, BPH, and A-fib status post ablation in April 2021 who presents for evaluation of abdominal pain and changes in bowel habits.  Patient was evaluated in the ED on 5/6/2024 for 1 day history of worsening abdominal pain.  CT abdomen pelvis with no acute findings.    Labs 5/24/2024 reviewed.  Stool O&P negative.  CBC, CMP, and TSH normal.      Patient reports last year after his colonoscopy he had some mild abdominal cramping for a few weeks/months, and the symptoms eventually resolved.  He then tried some weight loss supplements such as keto gummies and a supplement called  \"V-Shred.\"  After he discontinued the supplements, over the last few weeks, his pain has recurred.  He has intermittent suprapubic pain which sometimes radiates to the LLQ.  This pain is described as pinching/tugging.  He had an abdominal hernia repair with mesh in 1998.  He also describes stools are small and he has less frequent BMs than baseline, but denies any severe constipation symptoms.  He has noticed that eating more salt makes his symptoms worse.  At times he is having nausea.    Colonoscopy 6/16/2023 with normal terminal ileum, 1 subcentimeter tubular adenoma removed, moderate pancolonic diverticulosis most significant in the sigmoid colon noted, and internal hemorrhoids noted.  Recommended for repeat colonoscopy in 7 years.    REVIEW OF SYSTEMS:  10 point ROS reviewed and negative, except as above      Historical Information   Past Medical History:   Diagnosis Date   • A-fib (HCC)    • Allergic 2020    Allergic to the sun.   • Arthritis 2012    Hip   • Basal cell carcinoma 2010    [place], [place]   • Diverticulitis of colon 5/6/2024    First episode, ER visit   • Ear problems 2004    excessive wax   • Hypertension    • [place]ru[place] Childhood     Past Surgical History:   Procedure Laterality Date   • CARDIAC ELECTROPHYSIOLOGY MAPPING AND ABLATION     • COLONOSCOPY     • HERNIA REPAIR   "   • SKIN BIOPSY  2010    [place], [place]   • VASECTOMY       Social History   Social History     Substance and Sexual Activity   Alcohol Use Yes   • Alcohol/week: 3.0 standard drinks of alcohol   • Types: 3 Standard drinks or equivalent per week    Comment: occasional     Social History     Substance and Sexual Activity   Drug Use Never     Social History     Tobacco Use   Smoking Status Never   Smokeless Tobacco Never   Tobacco Comments    None     Family History   Problem Relation Age of Onset   • Cancer Mother         Lung- smoking   • Hypertension Mother         High blood pressure   • Clotting disorder Father         Had a stroke on blood thinners   • Diabetes Father         After his heart attack   • Cancer Father         Lung - smoking   • Stroke Father         Had multiple minor strokes   • Heart disease Father    • Arthritis Brother         2 hip replacements   • Arthritis Sister         Hip and knee replacement.   • Depression Sister         Psychiatric disorders   • Mental illness Sister         Nervous breakdown       Meds/Allergies       Current Outpatient Medications:   •  amLODIPine-valsartan (EXFORGE) 5-160 MG per tablet  •  Ascorbic Acid (vitamin C) 100 MG tablet  •  ezetimibe (ZETIA) 10 mg tablet  •  loratadine (CLARITIN) 5 MG chewable tablet  •  Multiple Vitamin (multivitamin) tablet  •  Saw Palmetto 160 MG CAPS  •  simethicone (MYLICON,GAS-X) 180 MG capsule  •  Turmeric 500 MG CAPS  •  dicyclomine (BENTYL) 20 mg tablet    Allergies   Allergen Reactions   • Statins Chest Pain   • Crestor [Rosuvastatin] Other (See Comments)     Joint pain   • Zocor [Simvastatin] Other (See Comments)     Chest pains             Objective     Wt Readings from Last 3 Encounters:   05/30/24 79.4 kg (175 lb)   05/06/24 80 kg (176 lb 5.9 oz)   10/20/23 78.9 kg (174 lb)     Temp Readings from Last 3 Encounters:   05/30/24 (!) 96.6 °F (35.9 °C) (Tympanic)   05/06/24 98 °F (36.7 °C) (Oral)   10/20/23 98.8 °F (37.1 °C)     BP  Readings from Last 3 Encounters:   05/30/24 112/71   05/06/24 130/84   10/20/23 124/82     Pulse Readings from Last 3 Encounters:   05/30/24 76   05/06/24 75   10/20/23 71          PHYSICAL EXAM:      Physical Exam  Vitals reviewed.   Constitutional:       General: He is not in acute distress.     Appearance: He is well-developed.   HENT:      Head: Normocephalic and atraumatic.   Eyes:      Conjunctiva/sclera: Conjunctivae normal.   Cardiovascular:      Rate and Rhythm: Normal rate and regular rhythm.      Heart sounds: No murmur heard.  Pulmonary:      Effort: Pulmonary effort is normal. No respiratory distress.      Breath sounds: Normal breath sounds.   Abdominal:      General: Bowel sounds are normal. There is no distension.      Palpations: Abdomen is soft.      Tenderness: There is abdominal tenderness in the suprapubic area.   Musculoskeletal:         General: No swelling.      Cervical back: Neck supple.   Skin:     General: Skin is warm and dry.   Neurological:      Mental Status: He is alert.   Psychiatric:         Mood and Affect: Mood normal.          Lab Results:   No visits with results within 1 Day(s) from this visit.   Latest known visit with results is:   Appointment on 05/24/2024   Component Date Value   • WBC 05/24/2024 6.10    • RBC 05/24/2024 5.30    • Hemoglobin 05/24/2024 15.4    • Hematocrit 05/24/2024 46.5    • MCV 05/24/2024 88    • MCH 05/24/2024 29.1    • MCHC 05/24/2024 33.1    • RDW 05/24/2024 12.4    • MPV 05/24/2024 10.8    • Platelets 05/24/2024 297    • nRBC 05/24/2024 0    • Segmented % 05/24/2024 61    • Immature Grans % 05/24/2024 0    • Lymphocytes % 05/24/2024 27    • Monocytes % 05/24/2024 8    • Eosinophils Relative 05/24/2024 3    • Basophils Relative 05/24/2024 1    • Absolute Neutrophils 05/24/2024 3.79    • Absolute Immature Grans 05/24/2024 0.02    • Absolute Lymphocytes 05/24/2024 1.63    • Absolute Monocytes 05/24/2024 0.46    • Eosinophils Absolute 05/24/2024 0.16   "  • Basophils Absolute 05/24/2024 0.04    • Sodium 05/24/2024 140    • Potassium 05/24/2024 4.6    • Chloride 05/24/2024 106    • CO2 05/24/2024 25    • ANION GAP 05/24/2024 9    • BUN 05/24/2024 16    • Creatinine 05/24/2024 0.98    • Glucose, Fasting 05/24/2024 97    • Calcium 05/24/2024 9.0    • AST 05/24/2024 20    • ALT 05/24/2024 19    • Alkaline Phosphatase 05/24/2024 78    • Total Protein 05/24/2024 7.1    • Albumin 05/24/2024 4.2    • Total Bilirubin 05/24/2024 0.35    • eGFR 05/24/2024 85    • Cholesterol 05/24/2024 179    • Triglycerides 05/24/2024 115    • HDL, Direct 05/24/2024 33 (L)    • LDL Calculated 05/24/2024 123 (H)    • TSH 3RD GENERATON 05/24/2024 1.894    • PSA 05/24/2024 0.57    • Hemoglobin A1C 05/24/2024 5.5    • EAG 05/24/2024 111        Lab Results   Component Value Date    WBC 6.10 05/24/2024    HGB 15.4 05/24/2024    HCT 46.5 05/24/2024    MCV 88 05/24/2024     05/24/2024       Lab Results   Component Value Date    SODIUM 140 05/24/2024    K 4.6 05/24/2024     05/24/2024    CO2 25 05/24/2024    AGAP 9 05/24/2024    BUN 16 05/24/2024    CREATININE 0.98 05/24/2024    GLUC 88 05/06/2024    GLUF 97 05/24/2024    CALCIUM 9.0 05/24/2024    AST 20 05/24/2024    ALT 19 05/24/2024    ALKPHOS 78 05/24/2024    TP 7.1 05/24/2024    TBILI 0.35 05/24/2024    EGFR 85 05/24/2024         Radiology Results:   CT abdomen pelvis with contrast    Result Date: 5/6/2024  Narrative: CT ABDOMEN AND PELVIS WITH IV CONTRAST INDICATION: abd pain.   \"Patient is a 58 year old male coming in with persistent abdominal pain. On exam, patient is well appearing in no distress and non peritoneal. Will obtain labs, CT and Urine.\" \"Based on history and physical concerning for appendicitis versus pancreatitis versus gastroenteritis versus ileus versus diverticulitis versus obstipation gastritis versus etiology concerning from a irritable bowel syndrome\" COMPARISON: None. TECHNIQUE: CT examination of the abdomen " and pelvis was performed. Multiplanar 2D reformatted images were created from the source data. This examination, like all CT scans performed in the [facility], was performed utilizing techniques to minimize radiation dose exposure, including the use of iterative reconstruction and automated exposure control. Radiation dose length product (DLP) for this visit: 492 mGy-cm IV Contrast: 100 mL of iohexol (OMNIPAQUE) Enteric Contrast: Not administered. FINDINGS: ABDOMEN LOWER CHEST: No clinically significant abnormality in the visualized lower chest. LIVER/BILIARY TREE: Left hepatic cyst. GALLBLADDER: No calcified gallstones. No pericholecystic inflammatory change. SPLEEN: Unremarkable. PANCREAS: Unremarkable. ADRENAL GLANDS: Unremarkable. KIDNEYS/URETERS: Unremarkable. No hydronephrosis. STOMACH AND BOWEL: Unremarkable. APPENDIX: Noninflamed. ABDOMINOPELVIC CAVITY: No ascites. No pneumoperitoneum. No lymphadenopathy. VESSELS: Unremarkable for patient's age. PELVIS REPRODUCTIVE ORGANS: Prostamegaly. URINARY BLADDER: Unremarkable. ABDOMINAL WALL/INGUINAL REGIONS: Status post bilateral inguinal hernia repair. BONES: No acute fracture or suspicious osseous lesion. Left iliac bone islands. Spinal degenerative changes. Minimal retrolisthesis of L4 on L5. Severe bilateral hip degenerative changes.     Impression: No acute findings in the abdomen or pelvis. Workstation performed: [id number]

## 2024-05-31 ENCOUNTER — OFFICE VISIT (OUTPATIENT)
Dept: FAMILY MEDICINE CLINIC | Facility: CLINIC | Age: 59
End: 2024-05-31
Payer: COMMERCIAL

## 2024-05-31 VITALS
DIASTOLIC BLOOD PRESSURE: 90 MMHG | OXYGEN SATURATION: 99 % | TEMPERATURE: 97.7 F | WEIGHT: 177 LBS | SYSTOLIC BLOOD PRESSURE: 118 MMHG | BODY MASS INDEX: 24.78 KG/M2 | HEART RATE: 70 BPM | HEIGHT: 71 IN

## 2024-05-31 DIAGNOSIS — I10 PRIMARY HYPERTENSION: ICD-10-CM

## 2024-05-31 DIAGNOSIS — E78.2 MIXED HYPERLIPIDEMIA: ICD-10-CM

## 2024-05-31 DIAGNOSIS — Z00.00 ANNUAL PHYSICAL EXAM: Primary | ICD-10-CM

## 2024-05-31 PROCEDURE — 99396 PREV VISIT EST AGE 40-64: CPT | Performed by: FAMILY MEDICINE

## 2024-05-31 RX ORDER — EZETIMIBE 10 MG/1
10 TABLET ORAL DAILY
Qty: 90 TABLET | Refills: 1 | Status: SHIPPED | OUTPATIENT
Start: 2024-05-31 | End: 2024-11-27

## 2024-05-31 NOTE — PROGRESS NOTES
Adult Annual Physical  Name: Floyd Owen      : 1965      MRN: [id number]  Encounter Provider: Jose F Mixon DO  Encounter Date: 2024   Encounter department: [facility]    Assessment & Plan   1. Annual physical exam  Assessment & Plan:  Patient was seen for annual physical exam.  Overall doing well.  He is having some lower abdominal discomfort intermittently.  He did see gastroenterology yesterday and he will be making some dietary adjustments taking simethicone and increasing his fiber.  His last colonoscopy was less than 1 year ago and was unremarkable.  We reviewed his blood work today which also shows a marked improvement in his lipids since starting Zetia 6 months ago.  Blood pressure under good control.  Immunizations are up-to-date.  Continue current medications.  Recheck in 6 months for labs and BP check  2. Primary hypertension  3. Mixed hyperlipidemia  -     ezetimibe (ZETIA) 10 mg tablet; Take 1 tablet (10 mg total) by mouth daily  -     Comprehensive metabolic panel; Future; Expected date: 11/15/2024  -     Lipid Panel with Direct LDL reflex; Future; Expected date: 11/15/2024  -     TSH, 3rd generation; Future; Expected date: 11/15/2024    Immunizations and preventive care screenings were discussed with patient today. Appropriate education was printed on patient's after visit summary.    Discussed risks and benefits of prostate cancer screening. We discussed the controversial history of PSA screening for prostate cancer in the [place] as well as the risk of over detection and over treatment of prostate cancer by way of PSA screening.  The patient understands that PSA blood testing is an imperfect way to screen for prostate cancer and that elevated PSA levels in the blood may also be caused by infection, inflammation, prostatic trauma or manipulation, urological procedures, or by benign prostatic enlargement.    The role of the digital rectal  examination in prostate cancer screening was also discussed and I discussed with him that there is large interobserver variability in the findings of digital rectal examination.    Counseling:  Alcohol/drug use: discussed moderation in alcohol intake, the recommendations for healthy alcohol use, and avoidance of illicit drug use.  Dental Health: discussed importance of regular tooth brushing, flossing, and dental visits.  Injury prevention: discussed safety/seat belts, safety helmets, smoke detectors, carbon dioxide detectors, and smoking near bedding or upholstery.  Exercise: the importance of regular exercise/physical activity was discussed. Recommend exercise 3-5 times per week for at least 30 minutes.       Depression Screening and Follow-up Plan: Patient was screened for depression during today's encounter. They screened negative with a PHQ-2 score of 0.        History of Present Illness     Adult Annual Physical:  Patient presents for annual physical.     Diet and Physical Activity:  - Diet/Nutrition: limited junk food.  - Exercise: walking, 1-2 times a week on average and strength training exercises.    Depression Screening:  - PHQ-2 Score: 0    General Health:  - Sleep: 4-6 hours of sleep on average.  - Hearing: normal hearing bilateral ears.  - Vision: wears glasses and most recent eye exam > 1 year ago.  - Dental: regular dental visits.     Health:    - Urinary symptoms: weak urinary stream and nocturia.     Review of Systems   Constitutional: Negative.    HENT: Negative.  Negative for congestion, ear pain, hearing loss, nosebleeds, sore throat and trouble swallowing.    Eyes: Negative.    Respiratory:  Negative for apnea, cough, chest tightness, shortness of breath and wheezing.    Cardiovascular: Negative.    Gastrointestinal:  Positive for abdominal distention and abdominal pain. Negative for blood in stool, constipation, diarrhea, nausea and vomiting.   Endocrine: Negative.    Genitourinary:   "Negative for difficulty urinating, dysuria, frequency, hematuria and urgency.   Musculoskeletal:  Negative for arthralgias, joint swelling and myalgias.   Skin:  Negative for rash.   Neurological:  Negative for dizziness, syncope, light-headedness, numbness and headaches.   Hematological: Negative.    Psychiatric/Behavioral:  Negative for confusion, dysphoric mood and sleep disturbance. The patient is not nervous/anxious.          Objective     /90 (BP Location: Left arm, Patient Position: Sitting, Cuff Size: Adult)   Pulse 70   Temp 97.7 °F (36.5 °C)   Ht 5' 10.5\" (1.791 m)   Wt 80.3 kg (177 lb)   SpO2 99%   BMI 25.04 kg/m²     Physical Exam  Vitals and nursing note reviewed.   Constitutional:       Appearance: Normal appearance. He is well-developed.   HENT:      Head: Normocephalic.      Right Ear: External ear normal.      Left Ear: External ear normal.      Nose: Nose normal.      Mouth/Throat:      Mouth: Mucous membranes are moist.   Eyes:      Conjunctiva/sclera: Conjunctivae normal.      Pupils: Pupils are equal, round, and reactive to light.   Cardiovascular:      Rate and Rhythm: Normal rate and regular rhythm.      Heart sounds: Normal heart sounds.   Pulmonary:      Effort: Pulmonary effort is normal.      Breath sounds: Normal breath sounds.   Abdominal:      General: Bowel sounds are normal.      Palpations: Abdomen is soft.   Musculoskeletal:         General: Normal range of motion.      Cervical back: Normal range of motion and neck supple.   Skin:     General: Skin is warm and dry.   Neurological:      Mental Status: He is alert.   Psychiatric:         Mood and Affect: Mood normal.         Behavior: Behavior normal.         Thought Content: Thought content normal.         Judgment: Judgment normal.       Administrative Statements         "

## 2024-05-31 NOTE — ASSESSMENT & PLAN NOTE
Patient was seen for annual physical exam.  Overall doing well.  He is having some lower abdominal discomfort intermittently.  He did see gastroenterology yesterday and he will be making some dietary adjustments taking simethicone and increasing his fiber.  His last colonoscopy was less than 1 year ago and was unremarkable.  We reviewed his blood work today which also shows a marked improvement in his lipids since starting Zetia 6 months ago.  Blood pressure under good control.  Immunizations are up-to-date.  Continue current medications.  Recheck in 6 months for labs and BP check

## 2024-06-18 RX ORDER — BACILLUS COAGULANS/INULIN 1B-250 MG
1 CAPSULE ORAL DAILY
COMMUNITY
Start: 2024-05-31

## 2024-06-28 ENCOUNTER — CONSULT (OUTPATIENT)
Dept: UROLOGY | Facility: MEDICAL CENTER | Age: 59
End: 2024-06-28
Payer: COMMERCIAL

## 2024-06-28 VITALS
BODY MASS INDEX: 24.22 KG/M2 | WEIGHT: 173 LBS | HEIGHT: 71 IN | OXYGEN SATURATION: 98 % | SYSTOLIC BLOOD PRESSURE: 120 MMHG | HEART RATE: 73 BPM | DIASTOLIC BLOOD PRESSURE: 68 MMHG

## 2024-06-28 DIAGNOSIS — Z12.5 SCREENING FOR PROSTATE CANCER: ICD-10-CM

## 2024-06-28 DIAGNOSIS — N40.0 BENIGN PROSTATIC HYPERPLASIA, UNSPECIFIED WHETHER LOWER URINARY TRACT SYMPTOMS PRESENT: Primary | ICD-10-CM

## 2024-06-28 DIAGNOSIS — N52.02 CORPORO-VENOUS OCCLUSIVE ERECTILE DYSFUNCTION: ICD-10-CM

## 2024-06-28 LAB
SL AMB  POCT GLUCOSE, UA: NORMAL
SL AMB LEUKOCYTE ESTERASE,UA: NORMAL
SL AMB POCT BILIRUBIN,UA: NORMAL
SL AMB POCT BLOOD,UA: NORMAL
SL AMB POCT CLARITY,UA: CLEAR
SL AMB POCT COLOR,UA: YELLOW
SL AMB POCT KETONES,UA: NORMAL
SL AMB POCT NITRITE,UA: NORMAL
SL AMB POCT PH,UA: 6
SL AMB POCT SPECIFIC GRAVITY,UA: 1.01
SL AMB POCT URINE PROTEIN: NORMAL
SL AMB POCT UROBILINOGEN: 0.2

## 2024-06-28 PROCEDURE — 99203 OFFICE O/P NEW LOW 30 MIN: CPT | Performed by: UROLOGY

## 2024-06-28 PROCEDURE — 81003 URINALYSIS AUTO W/O SCOPE: CPT | Performed by: UROLOGY

## 2024-06-28 RX ORDER — TADALAFIL 20 MG/1
20 TABLET ORAL DAILY PRN
Qty: 10 TABLET | Refills: 11 | Status: SHIPPED | OUTPATIENT
Start: 2024-06-28 | End: 2024-07-01 | Stop reason: SDUPTHER

## 2024-06-28 NOTE — PROGRESS NOTES
Ambulatory Visit  Name: Floyd Owen      : 1965      MRN: [id number]  Encounter Provider: Zion Godinez MD  Encounter Date: 2024   Encounter department: [facility] FOR UROLOGY [place]    Assessment & Plan   1. Benign prostatic hyperplasia, unspecified whether lower urinary tract symptoms present  Comments:  Minimal symptomatology, overall voiding adequately with some frequency.  Orders:  -     POCT urine dip auto non-scope  2. Corporo-venous occlusive erectile dysfunction  Comments:  Mild ED with occasional early loss of erection.  Cialis 10 mg prescribed on as-needed basis  3. Screening for prostate cancer  Comments:  Recent PSA within normal limits and low with ARON not suspicious for malignancy      History of Present Illness     Floyd Owen is a 58 y.o. male who presents with a history of mild BPH with low AUA symptom score noting some urinary frequency.  The patient denies gross hematuria or urinary incontinence.  He has mild erectile dysfunction noting occasional episodes of short-lived erections.  He notes that in the past he has responded to oral Cialis.    PSA is well within normal limits and the patient presents for prostate examination.    Review of Systems   All other systems reviewed and are negative.    Pertinent Medical History           Medical History Reviewed by provider this encounter:  Tobacco  Allergies  Meds  Problems  Med Hx  Surg Hx  Fam Hx  Soc   Hx      Past Medical History   Past Medical History:   Diagnosis Date    A-fib (HCC)     Allergic 2020    Allergic to the sun.    Arthritis 2012    Hip    Basal cell carcinoma     [place], [place]    Diverticulitis of colon 2024    First episode, ER visit    Ear problems 2004    excessive wax    Hypertension     [place] Childhood     Past Surgical History:   Procedure Laterality Date    CARDIAC ELECTROPHYSIOLOGY MAPPING AND ABLATION      COLONOSCOPY      HERNIA REPAIR      SKIN BIOPSY       CAMMIE [place]    VASECTOMY       Family History   Problem Relation Age of Onset    Clotting disorder Father         Had a stroke on blood thinners    Diabetes Father         After his heart attack    Cancer Father         Lung - smoking    Stroke Father         Had multiple minor strokes    Heart disease Father     Cancer Mother         Lung- smoking    Hypertension Mother         High blood pressure    Arthritis Sister         Hip and knee replacement.    Depression Sister         Psychiatric disorders    Mental illness Sister         Nervous breakdown    Arthritis Brother         2 hip replacements     Current Outpatient Medications on File Prior to Visit   Medication Sig Dispense Refill    amLODIPine-valsartan (EXFORGE) 5-160 MG per tablet Take 1 tablet by mouth daily 90 tablet 1    Ascorbic Acid (vitamin C) 100 MG tablet Take 100 mg by mouth daily      Bacillus Coagulans-Inulin (Probiotic) 1-250 BILLION-MG CAPS Take 1 tablet by mouth daily      ezetimibe (ZETIA) 10 mg tablet Take 1 tablet (10 mg total) by mouth daily 90 tablet 1    fexofenadine (ALLEGRA) 30 MG/5ML suspension Take 30 mg by mouth daily      Multiple Vitamin (multivitamin) tablet Take 1 tablet by mouth daily      Saw [place] 160 MG CAPS Take by mouth      Turmeric 500 MG CAPS Take by mouth      loratadine (CLARITIN) 5 MG chewable tablet Chew 5 mg daily (Patient not taking: Reported on 6/28/2024)       No current facility-administered medications on file prior to visit.     Allergies   Allergen Reactions    Statins Chest Pain    Crestor [Rosuvastatin] Other (See Comments)     Joint pain    Zocor [Simvastatin] Other (See Comments)     Chest pains        Current Outpatient Medications on File Prior to Visit   Medication Sig Dispense Refill    amLODIPine-valsartan (EXFORGE) 5-160 MG per tablet Take 1 tablet by mouth daily 90 tablet 1    Ascorbic Acid (vitamin C) 100 MG tablet Take 100 mg by mouth daily      Bacillus Coagulans-Inulin (Probiotic) 1-250  BILLION-MG CAPS Take 1 tablet by mouth daily      ezetimibe (ZETIA) 10 mg tablet Take 1 tablet (10 mg total) by mouth daily 90 tablet 1    fexofenadine (ALLEGRA) 30 MG/5ML suspension Take 30 mg by mouth daily      Multiple Vitamin (multivitamin) tablet Take 1 tablet by mouth daily      Saw [place] 160 MG CAPS Take by mouth      Turmeric 500 MG CAPS Take by mouth      loratadine (CLARITIN) 5 MG chewable tablet Chew 5 mg daily (Patient not taking: Reported on 6/28/2024)       No current facility-administered medications on file prior to visit.      Social History     Tobacco Use    Smoking status: Never    Smokeless tobacco: Never    Tobacco comments:     None   Vaping Use    Vaping status: Never Used   Substance and Sexual Activity    Alcohol use: Yes     Alcohol/week: 3.0 standard drinks of alcohol     Types: 3 Standard drinks or equivalent per week     Comment: occasional    Drug use: Never    Sexual activity: Yes     Partners: Female     Birth control/protection: Post-menopausal, Male Sterilization     Comment: Vascetomy 2003     AUA SYMPTOM SCORE      Flowsheet Row Most Recent Value   AUA SYMPTOM SCORE    How often have you had a sensation of not emptying your bladder completely after you finished urinating? 1 (P)     How often have you had to urinate again less than two hours after you finished urinating? 1 (P)     How often have you found you stopped and started again several times when you urinate? 1 (P)     How often have you found it difficult to postpone urination? 0 (P)     How often have you had a weak urinary stream? 3 (P)     How often have you had to push or strain to begin urination? 1 (P)     How many times did you most typically get up to urinate from the time you went to bed at night until the time you got up in the morning? 2   Quality of Life: If you were to spend the rest of your life with your urinary condition just the way it is now, how would you feel about that? 2 (P)     AUA SYMPTOM SCORE  "9          Objective     /68 (BP Location: Left arm, Patient Position: Sitting, Cuff Size: Adult)   Pulse 73   Ht 5' 10.5\" (1.791 m)   Wt 78.5 kg (173 lb)   SpO2 98%   BMI 24.47 kg/m²   Physical Exam  Vitals reviewed.   Constitutional:       General: He is not in acute distress.     Appearance: Normal appearance. He is not ill-appearing, toxic-appearing or diaphoretic.   HENT:      Head: Normocephalic and atraumatic.      Nose: Nose normal.      Mouth/Throat:      Mouth: Mucous membranes are moist.   Eyes:      Extraocular Movements: Extraocular movements intact.   Pulmonary:      Effort: Pulmonary effort is normal. No respiratory distress.   Genitourinary:     Penis: Normal.       Testes: Normal.      Prostate: Normal.      Rectum: Normal.   Skin:     General: Skin is dry.   Neurological:      Mental Status: He is alert and oriented to person, place, and time.   Psychiatric:         Mood and Affect: Mood normal.         Behavior: Behavior normal.         Thought Content: Thought content normal.         Judgment: Judgment normal.       Results  Lab Results   Component Value Date    PSA 0.57 05/24/2024    PSA 0.4 04/08/2023     Lab Results   Component Value Date    CALCIUM 9.0 05/24/2024    K 4.6 05/24/2024    CO2 25 05/24/2024     05/24/2024    BUN 16 05/24/2024    CREATININE 0.98 05/24/2024     Lab Results   Component Value Date    WBC 6.10 05/24/2024    HGB 15.4 05/24/2024    HCT 46.5 05/24/2024    MCV 88 05/24/2024     05/24/2024       Office Urine Dip  Recent Results (from the past 1 hour(s))   POCT urine dip auto non-scope    Collection Time: 06/28/24  8:49 AM   Result Value Ref Range     COLOR,UA YELLOW     CLARITY,UA CLEAR     SPECIFIC GRAVITY,UA 1.010      PH,UA 6.0     LEUKOCYTE ESTERASE,UA NEG     NITRITE,UA NEG     GLUCOSE, UA MNEG     KETONES,UA NEG     BILIRUBIN,UA NEG     BLOOD,UA NEG     POCT URINE PROTEIN NEG     SL AMB POCT UROBILINOGEN 0.2    ]    Administrative Statements "

## 2024-06-28 NOTE — LETTER
2024     Jose F Mixon DO  [address]  [address]  [facility] [address]    Patient: Floyd Owen   YOB: 1965   Date of Visit: 2024       Dear Dr. Mixon:    Thank you for referring Floyd Owen to me for evaluation. Below are my notes for this consultation.    If you have questions, please do not hesitate to call me. I look forward to following your patient along with you.         Sincerely,        Zion Godinez MD        CC: No Recipients    Zion Godinez MD  2024  9:05 AM  Sign when Signing Visit  Ambulatory Visit  Name: Floyd Owen      : 1965      MRN: [id number]  Encounter Provider: Zion Godinez MD  Encounter Date: 2024   Encounter department: [facility] FOR UROLOGY [place]    Assessment & Plan  1. Benign prostatic hyperplasia, unspecified whether lower urinary tract symptoms present  Comments:  Minimal symptomatology, overall voiding adequately with some frequency.  Orders:  -     POCT urine dip auto non-scope  2. Corporo-venous occlusive erectile dysfunction  Comments:  Mild ED with occasional early loss of erection.  Cialis 10 mg prescribed on as-needed basis  3. Screening for prostate cancer  Comments:  Recent PSA within normal limits and low with ARON not suspicious for malignancy      History of Present Illness    Floyd Owen is a 58 y.o. male who presents with a history of mild BPH with low AUA symptom score noting some urinary frequency.  The patient denies gross hematuria or urinary incontinence.  He has mild erectile dysfunction noting occasional episodes of short-lived erections.  He notes that in the past he has responded to oral Cialis.    PSA is well within normal limits and the patient presents for prostate examination.    Review of Systems   All other systems reviewed and are negative.    Pertinent Medical History          Medical History Reviewed by provider this encounter:  Tobacco   Allergies  Meds  Problems  Med Hx  Surg Hx  Fam Hx  Soc   Hx      Past Medical History  Past Medical History:   Diagnosis Date   • A-fib (HCC)    • Allergic 2020    Allergic to the sun.   • Arthritis 2012    Hip   • [facility] [place]   • Diverticulitis of colon 5/6/2024    First episode, ER visit   • Ear problems 2004    excessive wax   • Hypertension    • [place]     Past Surgical History:   Procedure Laterality Date   • CARDIAC ELECTROPHYSIOLOGY MAPPING AND ABLATION     • COLONOSCOPY     • HERNIA REPAIR     • [facility]       Family History   Problem Relation Age of Onset   • Clotting disorder Father         Had a stroke on blood thinners   • Diabetes Father         After his heart attack   • Cancer Father         Lung - smoking   • Stroke Father         Had multiple minor strokes   • Heart disease Father    • Cancer Mother         Lung- smoking   • Hypertension Mother         High blood pressure   • Arthritis Sister         Hip and knee replacement.   • Depression Sister         Psychiatric disorders   • Mental illness Sister         Nervous breakdown   • Arthritis Brother         2 hip replacements     Current Outpatient Medications on File Prior to Visit   Medication Sig Dispense Refill   • amLODIPine-valsartan (EXFORGE) 5-160 MG per tablet Take 1 tablet by mouth daily 90 tablet 1   • Ascorbic Acid (vitamin C) 100 MG tablet Take 100 mg by mouth daily     • Bacillus Coagulans-Inulin (Probiotic) 1-250 BILLION-MG CAPS Take 1 tablet by mouth daily     • ezetimibe (ZETIA) 10 mg tablet Take 1 tablet (10 mg total) by mouth daily 90 tablet 1   • fexofenadine (ALLEGRA) 30 MG/5ML suspension Take 30 mg by mouth daily     • Multiple Vitamin (multivitamin) tablet Take 1 tablet by mouth daily     • Saw [place] 160 MG CAPS Take by mouth     • Turmeric 500 MG CAPS Take by mouth     • loratadine (CLARITIN) 5 MG chewable tablet Chew 5 mg daily (Patient  not taking: Reported on 6/28/2024)       No current facility-administered medications on file prior to visit.     Allergies   Allergen Reactions   • Statins Chest Pain   • Crestor [Rosuvastatin] Other (See Comments)     Joint pain   • Zocor [Simvastatin] Other (See Comments)     Chest pains        Current Outpatient Medications on File Prior to Visit   Medication Sig Dispense Refill   • amLODIPine-valsartan (EXFORGE) 5-160 MG per tablet Take 1 tablet by mouth daily 90 tablet 1   • Ascorbic Acid (vitamin C) 100 MG tablet Take 100 mg by mouth daily     • Bacillus Coagulans-Inulin (Probiotic) 1-250 BILLION-MG CAPS Take 1 tablet by mouth daily     • ezetimibe (ZETIA) 10 mg tablet Take 1 tablet (10 mg total) by mouth daily 90 tablet 1   • fexofenadine (ALLEGRA) 30 MG/5ML suspension Take 30 mg by mouth daily     • Multiple Vitamin (multivitamin) tablet Take 1 tablet by mouth daily     • Saw [place] 160 MG CAPS Take by mouth     • Turmeric 500 MG CAPS Take by mouth     • loratadine (CLARITIN) 5 MG chewable tablet Chew 5 mg daily (Patient not taking: Reported on 6/28/2024)       No current facility-administered medications on file prior to visit.      Social History     Tobacco Use   • Smoking status: Never   • Smokeless tobacco: Never   • Tobacco comments:     None   Vaping Use   • Vaping status: Never Used   Substance and Sexual Activity   • Alcohol use: Yes     Alcohol/week: 3.0 standard drinks of alcohol     Types: 3 Standard drinks or equivalent per week     Comment: occasional   • Drug use: Never   • Sexual activity: Yes     Partners: Female     Birth control/protection: Post-menopausal, Male Sterilization     Comment: Vascetomy 2003     AUA SYMPTOM SCORE      Flowsheet Row Most Recent Value   AUA SYMPTOM SCORE    How often have you had a sensation of not emptying your bladder completely after you finished urinating? 1 (P)     How often have you had to urinate again less than two hours after you finished urinating? 1  "(P)     How often have you found you stopped and started again several times when you urinate? 1 (P)     How often have you found it difficult to postpone urination? 0 (P)     How often have you had a weak urinary stream? 3 (P)     How often have you had to push or strain to begin urination? 1 (P)     How many times did you most typically get up to urinate from the time you went to bed at night until the time you got up in the morning? 2   Quality of Life: If you were to spend the rest of your life with your urinary condition just the way it is now, how would you feel about that? 2 (P)     AUA SYMPTOM SCORE 9          Objective    /68 (BP Location: Left arm, Patient Position: Sitting, Cuff Size: Adult)   Pulse 73   Ht 5' 10.5\" (1.791 m)   Wt 78.5 kg (173 lb)   SpO2 98%   BMI 24.47 kg/m²   Physical Exam  Vitals reviewed.   Constitutional:       General: He is not in acute distress.     Appearance: Normal appearance. He is not ill-appearing, toxic-appearing or diaphoretic.   HENT:      Head: Normocephalic and atraumatic.      Nose: Nose normal.      Mouth/Throat:      Mouth: Mucous membranes are moist.   Eyes:      Extraocular Movements: Extraocular movements intact.   Pulmonary:      Effort: Pulmonary effort is normal. No respiratory distress.   Genitourinary:     Penis: Normal.       Testes: Normal.      Prostate: Normal.      Rectum: Normal.   Skin:     General: Skin is dry.   Neurological:      Mental Status: He is alert and oriented to person, place, and time.   Psychiatric:         Mood and Affect: Mood normal.         Behavior: Behavior normal.         Thought Content: Thought content normal.         Judgment: Judgment normal.       Results  Lab Results   Component Value Date    PSA 0.57 05/24/2024    PSA 0.4 04/08/2023     Lab Results   Component Value Date    CALCIUM 9.0 05/24/2024    K 4.6 05/24/2024    CO2 25 05/24/2024     05/24/2024    BUN 16 05/24/2024    CREATININE 0.98 05/24/2024 "     Lab Results   Component Value Date    WBC 6.10 05/24/2024    HGB 15.4 05/24/2024    HCT 46.5 05/24/2024    MCV 88 05/24/2024     05/24/2024       Office Urine Dip  Recent Results (from the past 1 hour(s))   POCT urine dip auto non-scope    Collection Time: 06/28/24  8:49 AM   Result Value Ref Range     COLOR,UA YELLOW     CLARITY,UA CLEAR     SPECIFIC GRAVITY,UA 1.010      PH,UA 6.0     LEUKOCYTE ESTERASE,UA NEG     NITRITE,UA NEG     GLUCOSE, UA MNEG     KETONES,UA NEG     BILIRUBIN,UA NEG     BLOOD,UA NEG     POCT URINE PROTEIN NEG     SL AMB POCT UROBILINOGEN 0.2    ]    Administrative Statements

## 2024-07-01 DIAGNOSIS — N52.02 CORPORO-VENOUS OCCLUSIVE ERECTILE DYSFUNCTION: ICD-10-CM

## 2024-07-01 RX ORDER — TADALAFIL 20 MG/1
20 TABLET ORAL DAILY PRN
Qty: 10 TABLET | Refills: 11 | Status: SHIPPED | OUTPATIENT
Start: 2024-07-01 | End: 2024-07-05 | Stop reason: SDUPTHER

## 2024-07-05 DIAGNOSIS — I10 ESSENTIAL HYPERTENSION: ICD-10-CM

## 2024-07-05 DIAGNOSIS — N52.02 CORPORO-VENOUS OCCLUSIVE ERECTILE DYSFUNCTION: ICD-10-CM

## 2024-07-05 RX ORDER — AMLODIPINE AND VALSARTAN 5; 160 MG/1; MG/1
1 TABLET ORAL DAILY
Qty: 90 TABLET | Refills: 1 | Status: SHIPPED | OUTPATIENT
Start: 2024-07-05

## 2024-07-05 RX ORDER — TADALAFIL 20 MG/1
20 TABLET ORAL DAILY PRN
Qty: 30 TABLET | Refills: 3 | Status: SHIPPED | OUTPATIENT
Start: 2024-07-05

## 2024-07-25 ENCOUNTER — TELEPHONE (OUTPATIENT)
Dept: GASTROENTEROLOGY | Facility: CLINIC | Age: 59
End: 2024-07-25

## 2024-07-25 NOTE — TELEPHONE ENCOUNTER
Left voicemail and requested call back     Pt informed of location back to [facility]

## 2024-08-01 ENCOUNTER — TELEPHONE (OUTPATIENT)
Age: 59
End: 2024-08-01

## 2024-08-01 NOTE — TELEPHONE ENCOUNTER
Pt calling to cx appt today with GI due to emergency with wife. Pt given phone number for GI and transferred over to cx appt. No further action is needed at this time.    Erythromycin Counseling:  I discussed with the patient the risks of erythromycin including but not limited to GI upset, allergic reaction, drug rash, diarrhea, increase in liver enzymes, and yeast infections.

## 2024-10-11 ENCOUNTER — PATIENT MESSAGE (OUTPATIENT)
Dept: FAMILY MEDICINE CLINIC | Facility: CLINIC | Age: 59
End: 2024-10-11

## 2024-10-24 DIAGNOSIS — S50.869A TICK BITE OF FOREARM, UNSPECIFIED LATERALITY, INITIAL ENCOUNTER: Primary | ICD-10-CM
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
## 2024-10-25 ENCOUNTER — APPOINTMENT (OUTPATIENT)
Dept: LAB | Facility: CLINIC | Age: 59
End: 2024-10-25
Payer: COMMERCIAL
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
## 2024-10-25 DIAGNOSIS — S50.869A TICK BITE OF FOREARM, UNSPECIFIED LATERALITY, INITIAL ENCOUNTER: ICD-10-CM

## 2024-10-25 LAB — B BURGDOR IGG+IGM SER QL IA: NEGATIVE

## 2024-10-25 PROCEDURE — 36415 COLL VENOUS BLD VENIPUNCTURE: CPT

## 2024-10-25 PROCEDURE — 86618 LYME DISEASE ANTIBODY: CPT

## 2024-11-22 ENCOUNTER — APPOINTMENT (OUTPATIENT)
Dept: LAB | Facility: CLINIC | Age: 59
End: 2024-11-22
Payer: COMMERCIAL

## 2024-11-22 DIAGNOSIS — E78.2 MIXED HYPERLIPIDEMIA: ICD-10-CM

## 2024-11-22 LAB
ALBUMIN SERPL BCG-MCNC: 4.3 G/DL (ref 3.5–5)
ALP SERPL-CCNC: 83 U/L (ref 34–104)
ALT SERPL W P-5'-P-CCNC: 22 U/L (ref 7–52)
ANION GAP SERPL CALCULATED.3IONS-SCNC: 5 MMOL/L (ref 4–13)
AST SERPL W P-5'-P-CCNC: 19 U/L (ref 13–39)
BILIRUB SERPL-MCNC: 0.42 MG/DL (ref 0.2–1)
BUN SERPL-MCNC: 16 MG/DL (ref 5–25)
CALCIUM SERPL-MCNC: 9 MG/DL (ref 8.4–10.2)
CHLORIDE SERPL-SCNC: 106 MMOL/L (ref 96–108)
CHOLEST SERPL-MCNC: 171 MG/DL (ref ?–200)
CO2 SERPL-SCNC: 28 MMOL/L (ref 21–32)
CREAT SERPL-MCNC: 0.93 MG/DL (ref 0.6–1.3)
GFR SERPL CREATININE-BSD FRML MDRD: 90 ML/MIN/1.73SQ M
GLUCOSE P FAST SERPL-MCNC: 88 MG/DL (ref 65–99)
HDLC SERPL-MCNC: 39 MG/DL
LDLC SERPL CALC-MCNC: 117 MG/DL (ref 0–100)
POTASSIUM SERPL-SCNC: 4 MMOL/L (ref 3.5–5.3)
PROT SERPL-MCNC: 7 G/DL (ref 6.4–8.4)
SODIUM SERPL-SCNC: 139 MMOL/L (ref 135–147)
TRIGL SERPL-MCNC: 76 MG/DL (ref ?–150)
TSH SERPL DL<=0.05 MIU/L-ACNC: 2.25 UIU/ML (ref 0.45–4.5)

## 2024-11-22 PROCEDURE — 80061 LIPID PANEL: CPT

## 2024-11-22 PROCEDURE — 36415 COLL VENOUS BLD VENIPUNCTURE: CPT

## 2024-11-22 PROCEDURE — 80053 COMPREHEN METABOLIC PANEL: CPT

## 2024-11-22 PROCEDURE — 84443 ASSAY THYROID STIM HORMONE: CPT

## 2024-12-13 ENCOUNTER — OFFICE VISIT (OUTPATIENT)
Dept: FAMILY MEDICINE CLINIC | Facility: CLINIC | Age: 59
End: 2024-12-13
Payer: COMMERCIAL

## 2024-12-13 VITALS
HEART RATE: 96 BPM | OXYGEN SATURATION: 98 % | SYSTOLIC BLOOD PRESSURE: 120 MMHG | DIASTOLIC BLOOD PRESSURE: 78 MMHG | TEMPERATURE: 98.1 F

## 2024-12-13 DIAGNOSIS — Z13.1 SCREENING FOR DIABETES MELLITUS: ICD-10-CM

## 2024-12-13 DIAGNOSIS — E78.2 MIXED HYPERLIPIDEMIA: ICD-10-CM

## 2024-12-13 DIAGNOSIS — Z12.5 SCREENING FOR PROSTATE CANCER: ICD-10-CM

## 2024-12-13 DIAGNOSIS — I10 PRIMARY HYPERTENSION: Primary | ICD-10-CM

## 2024-12-13 PROCEDURE — 99213 OFFICE O/P EST LOW 20 MIN: CPT | Performed by: FAMILY MEDICINE

## 2024-12-13 NOTE — PROGRESS NOTES
Name: Floyd Owen      : 1965      MRN: [id number]  Encounter Provider: Jose F Mixon DO  Encounter Date: 2024   Encounter department: [facility]    Assessment & Plan  Primary hypertension  Well-controlled on amlodipine/valsartan 5/160       Mixed hyperlipidemia  Lipids gradually improving.  HDL up to 39.  LDL down to 117.  Patient tolerating Zetia without difficulty.  Continue diet and exercise  Orders:  •  Comprehensive metabolic panel; Future  •  Lipid Panel with Direct LDL reflex; Future  •  TSH, 3rd generation with Free T4 reflex; Future    Screening for prostate cancer    Orders:  •  PSA, Total Screen; Future    Screening for diabetes mellitus    Orders:  •  Hemoglobin A1C; Future         History of Present Illness     Patient was seen for follow-up of chronic medical problems.  He is being treated for hypertension and hyperlipidemia.  No complaints      Review of Systems   Constitutional: Negative.    Respiratory: Negative.     Cardiovascular: Negative.    Gastrointestinal: Negative.    Genitourinary: Negative.    Musculoskeletal: Negative.    Psychiatric/Behavioral: Negative.       Past Medical History:   Diagnosis Date   • A-fib (HCC)    • Allergic 2020    Allergic to the sun.   • Arthritis 2012    Hip   • Basal cell carcinoma     [facility] [place]   • Diverticulitis of colon 2024    First episode, ER visit   • Ear problems 2004    excessive wax   • Hypertension    • [place] Childhood     Past Surgical History:   Procedure Laterality Date   • CARDIAC ELECTROPHYSIOLOGY MAPPING AND ABLATION     • COLONOSCOPY     • HERNIA REPAIR     • SKIN BIOPSY      [facility] [place]   • VASECTOMY       Family History   Problem Relation Age of Onset   • Clotting disorder Father         Had a stroke on blood thinners   • Diabetes Father         After his heart attack   • Cancer Father         Lung - smoking   • Stroke Father         Had multiple minor strokes   • Heart  disease Father    • Cancer Mother         Lung- smoking   • Hypertension Mother         High blood pressure   • Arthritis Sister         Hip and knee replacement.   • Depression Sister         Psychiatric disorders   • Mental illness Sister         Nervous breakdown   • Psychiatric Illness Sister    • Arthritis Brother         2 hip replacements   • Asthma Son         Mild case   • Asthma Daughter         Mild case     Social History     Tobacco Use   • Smoking status: Never   • Smokeless tobacco: Never   • Tobacco comments:     None   Vaping Use   • Vaping status: Never Used   Substance and Sexual Activity   • Alcohol use: Yes     Alcohol/week: 3.0 standard drinks of alcohol     Types: 3 Standard drinks or equivalent per week     Comment: occasional   • Drug use: Never   • Sexual activity: Yes     Partners: Female     Birth control/protection: Male Sterilization     Comment: Vascetomy 2003     Current Outpatient Medications on File Prior to Visit   Medication Sig   • amLODIPine-valsartan (EXFORGE) 5-160 MG per tablet Take 1 tablet by mouth daily   • Ascorbic Acid (vitamin C) 100 MG tablet Take 100 mg by mouth daily   • Bacillus Coagulans-Inulin (Probiotic) 1-250 BILLION-MG CAPS Take 1 tablet by mouth daily   • loratadine (CLARITIN) 5 MG chewable tablet Chew 5 mg daily   • Multiple Vitamin (multivitamin) tablet Take 1 tablet by mouth daily   • Saw [place] 160 MG CAPS Take by mouth   • tadalafil (CIALIS) 20 MG tablet Take 1 tablet (20 mg total) by mouth daily as needed for erectile dysfunction   • Turmeric 500 MG CAPS Take by mouth   • ezetimibe (ZETIA) 10 mg tablet Take 1 tablet (10 mg total) by mouth daily   • [DISCONTINUED] fexofenadine (ALLEGRA) 30 MG/5ML suspension Take 30 mg by mouth daily     Allergies   Allergen Reactions   • Statins Chest Pain   • Crestor [Rosuvastatin] Other (See Comments)     Joint pain   • Zocor [Simvastatin] Other (See Comments)     Chest pains       Immunization History   Administered  Date(s) Administered   • COVID-19 PFIZER VACCINE 0.3 ML IM 08/09/2021, 11/19/2021   • COVID-19 Pfizer Vac BIVALENT [place]-sucrose 12 Yr+ IM 03/10/2023   • COVID-19 Pfizer vac ([place]-sucrose, gray cap) 12 yr+ IM 07/08/2022   • INFLUENZA 10/04/2021, 10/26/2023   • Influenza Injectable, MDCK, Preservative Free, Quadrivalent 10/04/2021   • Tdap 12/09/2022   • Zoster Vaccine Recombinant 12/13/2021     Objective   /78 (BP Location: Left arm, Patient Position: Sitting, Cuff Size: Standard)   Pulse 96   Temp 98.1 °F (36.7 °C) (Temporal)   SpO2 98%     Physical Exam  Vitals and nursing note reviewed.   Constitutional:       General: He is not in acute distress.     Appearance: Normal appearance.   HENT:      Head: Normocephalic and atraumatic.   Eyes:      Pupils: Pupils are equal, round, and reactive to light.   Cardiovascular:      Rate and Rhythm: Normal rate and regular rhythm.      Pulses: Normal pulses.      Heart sounds: Normal heart sounds.   Pulmonary:      Effort: Pulmonary effort is normal.      Breath sounds: Normal breath sounds.   Musculoskeletal:         General: Normal range of motion.      Cervical back: Normal range of motion.   Skin:     General: Skin is warm and dry.   Neurological:      General: No focal deficit present.      Mental Status: He is alert and oriented to person, place, and time. Mental status is at baseline.   Psychiatric:         Mood and Affect: Mood normal.         Behavior: Behavior normal.         Thought Content: Thought content normal.         Judgment: Judgment normal.

## 2024-12-13 NOTE — ASSESSMENT & PLAN NOTE
Lipids gradually improving.  HDL up to 39.  LDL down to 117.  Patient tolerating Zetia without difficulty.  Continue diet and exercise  Orders:  •  Comprehensive metabolic panel; Future  •  Lipid Panel with Direct LDL reflex; Future  •  TSH, 3rd generation with Free T4 reflex; Future

## 2025-01-02 DIAGNOSIS — I10 ESSENTIAL HYPERTENSION: ICD-10-CM

## 2025-01-03 RX ORDER — AMLODIPINE AND VALSARTAN 5; 160 MG/1; MG/1
1 TABLET ORAL DAILY
Qty: 90 TABLET | Refills: 1 | Status: SHIPPED | OUTPATIENT
Start: 2025-01-03

## 2025-04-22 ENCOUNTER — TELEPHONE (OUTPATIENT)
Age: 60
End: 2025-04-22

## 2025-04-22 DIAGNOSIS — U07.1 COVID-19: Primary | ICD-10-CM

## 2025-04-22 RX ORDER — NIRMATRELVIR AND RITONAVIR 300-100 MG
3 KIT ORAL 2 TIMES DAILY
Qty: 30 TABLET | Refills: 0 | Status: SHIPPED | OUTPATIENT
Start: 2025-04-22 | End: 2025-04-27

## 2025-04-22 NOTE — TELEPHONE ENCOUNTER
Patient tested COVID + 4/21 , cough congestion, body aches, fever 100.    No appointment available.    Pharmacy [facility] [place]  Please review

## 2025-05-29 DIAGNOSIS — E78.2 MIXED HYPERLIPIDEMIA: ICD-10-CM

## 2025-05-30 RX ORDER — EZETIMIBE 10 MG/1
10 TABLET ORAL DAILY
Qty: 90 TABLET | Refills: 1 | Status: SHIPPED | OUTPATIENT
Start: 2025-05-30 | End: 2025-11-26

## 2025-05-31 ENCOUNTER — APPOINTMENT (OUTPATIENT)
Dept: LAB | Facility: CLINIC | Age: 60
End: 2025-05-31
Payer: COMMERCIAL

## 2025-05-31 DIAGNOSIS — N52.02 CORPORO-VENOUS OCCLUSIVE ERECTILE DYSFUNCTION: ICD-10-CM

## 2025-05-31 DIAGNOSIS — N40.0 BENIGN PROSTATIC HYPERPLASIA, UNSPECIFIED WHETHER LOWER URINARY TRACT SYMPTOMS PRESENT: ICD-10-CM

## 2025-05-31 DIAGNOSIS — Z13.1 SCREENING FOR DIABETES MELLITUS: ICD-10-CM

## 2025-05-31 DIAGNOSIS — Z12.5 SCREENING FOR PROSTATE CANCER: ICD-10-CM

## 2025-05-31 DIAGNOSIS — E78.2 MIXED HYPERLIPIDEMIA: ICD-10-CM

## 2025-05-31 LAB
ALBUMIN SERPL BCG-MCNC: 4.5 G/DL (ref 3.5–5)
ALP SERPL-CCNC: 95 U/L (ref 34–104)
ALT SERPL W P-5'-P-CCNC: 24 U/L (ref 7–52)
ANION GAP SERPL CALCULATED.3IONS-SCNC: 6 MMOL/L (ref 4–13)
AST SERPL W P-5'-P-CCNC: 23 U/L (ref 13–39)
BILIRUB SERPL-MCNC: 0.54 MG/DL (ref 0.2–1)
BUN SERPL-MCNC: 19 MG/DL (ref 5–25)
CALCIUM SERPL-MCNC: 9.4 MG/DL (ref 8.4–10.2)
CHLORIDE SERPL-SCNC: 108 MMOL/L (ref 96–108)
CHOLEST SERPL-MCNC: 187 MG/DL (ref ?–200)
CO2 SERPL-SCNC: 26 MMOL/L (ref 21–32)
CREAT SERPL-MCNC: 0.91 MG/DL (ref 0.6–1.3)
EST. AVERAGE GLUCOSE BLD GHB EST-MCNC: 108 MG/DL
GFR SERPL CREATININE-BSD FRML MDRD: 92 ML/MIN/1.73SQ M
GLUCOSE P FAST SERPL-MCNC: 104 MG/DL (ref 65–99)
HBA1C MFR BLD: 5.4 %
HDLC SERPL-MCNC: 41 MG/DL
LDLC SERPL CALC-MCNC: 129 MG/DL (ref 0–100)
POTASSIUM SERPL-SCNC: 5.1 MMOL/L (ref 3.5–5.3)
PROT SERPL-MCNC: 7.2 G/DL (ref 6.4–8.4)
PSA SERPL-MCNC: 0.47 NG/ML (ref 0–4)
SODIUM SERPL-SCNC: 140 MMOL/L (ref 135–147)
TRIGL SERPL-MCNC: 84 MG/DL (ref ?–150)
TSH SERPL DL<=0.05 MIU/L-ACNC: 1.67 UIU/ML (ref 0.45–4.5)

## 2025-05-31 PROCEDURE — 80061 LIPID PANEL: CPT

## 2025-05-31 PROCEDURE — 80053 COMPREHEN METABOLIC PANEL: CPT

## 2025-05-31 PROCEDURE — 36415 COLL VENOUS BLD VENIPUNCTURE: CPT

## 2025-05-31 PROCEDURE — G0103 PSA SCREENING: HCPCS

## 2025-05-31 PROCEDURE — 83036 HEMOGLOBIN GLYCOSYLATED A1C: CPT

## 2025-05-31 PROCEDURE — 84443 ASSAY THYROID STIM HORMONE: CPT

## 2025-06-06 ENCOUNTER — RA CDI HCC (OUTPATIENT)
Dept: OTHER | Facility: HOSPITAL | Age: 60
End: 2025-06-06

## 2025-06-06 NOTE — PROGRESS NOTES
HCC coding opportunities       Chart reviewed, no opportunity found: CHART REVIEWED, NO OPPORTUNITY FOUND        Patients Insurance        Commercial Insurance: [employer]

## 2025-06-13 ENCOUNTER — OFFICE VISIT (OUTPATIENT)
Dept: FAMILY MEDICINE CLINIC | Facility: CLINIC | Age: 60
End: 2025-06-13
Payer: COMMERCIAL

## 2025-06-13 ENCOUNTER — PATIENT MESSAGE (OUTPATIENT)
Dept: FAMILY MEDICINE CLINIC | Facility: CLINIC | Age: 60
End: 2025-06-13

## 2025-06-13 VITALS
WEIGHT: 179 LBS | BODY MASS INDEX: 25.06 KG/M2 | DIASTOLIC BLOOD PRESSURE: 78 MMHG | OXYGEN SATURATION: 97 % | HEART RATE: 84 BPM | HEIGHT: 71 IN | TEMPERATURE: 97.8 F | SYSTOLIC BLOOD PRESSURE: 120 MMHG

## 2025-06-13 DIAGNOSIS — I10 PRIMARY HYPERTENSION: ICD-10-CM

## 2025-06-13 DIAGNOSIS — Z00.00 ANNUAL PHYSICAL EXAM: Primary | ICD-10-CM

## 2025-06-13 DIAGNOSIS — I10 ESSENTIAL HYPERTENSION: ICD-10-CM

## 2025-06-13 DIAGNOSIS — E78.2 MIXED HYPERLIPIDEMIA: ICD-10-CM

## 2025-06-13 PROCEDURE — 99396 PREV VISIT EST AGE 40-64: CPT | Performed by: FAMILY MEDICINE

## 2025-06-13 RX ORDER — AMLODIPINE AND VALSARTAN 5; 160 MG/1; MG/1
1 TABLET ORAL DAILY
Qty: 90 TABLET | Refills: 1 | Status: SHIPPED | OUTPATIENT
Start: 2025-06-13

## 2025-06-13 RX ORDER — EZETIMIBE 10 MG/1
10 TABLET ORAL DAILY
Qty: 90 TABLET | Refills: 1 | Status: SHIPPED | OUTPATIENT
Start: 2025-06-13 | End: 2025-12-10

## 2025-06-13 NOTE — PROGRESS NOTES
Adult Annual Physical  Name: Floyd Owen      : 1965      MRN: [id number]  Encounter Provider: Jose F Mixon DO  Encounter Date: 2025   Encounter department: [facility]    :  Assessment & Plan  Annual physical exam  Physical exam unremarkable.  Colon cancer screening up-to-date.  Immunizations current.  Routine lab work reviewed.  Diet and exercise discussed.       Primary hypertension         Mixed hyperlipidemia    Orders:  •  ezetimibe (ZETIA) 10 mg tablet; Take 1 tablet (10 mg total) by mouth daily    Essential hypertension    Orders:  •  amLODIPine-valsartan (EXFORGE) 5-160 MG per tablet; Take 1 tablet by mouth daily        Preventive Screenings:  - Diabetes Screening: screening up-to-date  - Cholesterol Screening: screening not indicated and has hyperlipidemia   - Hepatitis C screening: screening up-to-date   - HIV screening: screening up-to-date   - Colon cancer screening: screening up-to-date   - Lung cancer screening: screening not indicated   - Prostate cancer screening: screening up-to-date     Immunizations:  - Immunizations due: Prevnar 20 and Zoster (Shingrix)         History of Present Illness     Adult Annual Physical:  Patient presents for annual physical. Patient presents for annual physical exam.  No complaints..     Diet and Physical Activity:  - Diet/Nutrition: no special diet.  - Exercise: walking. I play a lot of golf.    Depression Screening:  - PHQ-2 Score: 0    General Health:  - Sleep: 4-6 hours of sleep on average.  - Hearing: normal hearing bilateral ears.  - Vision: no vision problems, most recent eye exam < 1 year ago and wears glasses.  - Dental: regular dental visits, brushes teeth twice daily and floss regularly.    /GYN Health:  - Follows with GYN: yes.   - History of STDs: no     Health:  - History of STDs: no.   - Urinary symptoms: weak urinary stream.     Advanced Care Planning:  - Has an advanced directive?: no    - Has a  "durable medical POA?: no      Review of Systems   Constitutional: Negative.    Respiratory: Negative.     Cardiovascular: Negative.    Gastrointestinal: Negative.    Genitourinary: Negative.    Musculoskeletal: Negative.    Psychiatric/Behavioral: Negative.           Objective   /78   Pulse 84   Temp 97.8 °F (36.6 °C)   Ht 5' 11\" (1.803 m)   Wt 81.2 kg (179 lb)   SpO2 97%   BMI 24.97 kg/m²     Physical Exam  Vitals and nursing note reviewed.   Constitutional:       Appearance: Normal appearance. He is well-developed.   HENT:      Head: Normocephalic and atraumatic.      Right Ear: Hearing, tympanic membrane and external ear normal.      Left Ear: Hearing, tympanic membrane, ear canal and external ear normal.      Nose: Nose normal. No nasal deformity or rhinorrhea.      Right Sinus: No maxillary sinus tenderness or frontal sinus tenderness.      Left Sinus: No maxillary sinus tenderness or frontal sinus tenderness.      Mouth/Throat:      Mouth: No oral lesions.      Dentition: Normal dentition.      Pharynx: Uvula midline. No oropharyngeal exudate or posterior oropharyngeal erythema.     Eyes:      General: Lids are normal.      Conjunctiva/sclera: Conjunctivae normal.      Pupils: Pupils are equal, round, and reactive to light.     Neck:      Thyroid: No thyroid mass or thyromegaly.      Vascular: No carotid bruit or JVD.      Trachea: Trachea normal.     Cardiovascular:      Rate and Rhythm: Normal rate and regular rhythm.      Pulses: Normal pulses.           Carotid pulses are 2+ on the right side and 2+ on the left side.       Radial pulses are 2+ on the right side and 2+ on the left side.      Heart sounds: No murmur heard.  Pulmonary:      Effort: No accessory muscle usage or respiratory distress.      Breath sounds: Normal breath sounds.   Abdominal:      General: Bowel sounds are normal. There is no distension.      Palpations: Abdomen is soft. There is no mass.      Tenderness: There is no " abdominal tenderness.      Hernia: No hernia is present.     Musculoskeletal:         General: No tenderness or deformity. Normal range of motion.      Cervical back: Normal range of motion and neck supple.   Lymphadenopathy:      Cervical: No cervical adenopathy.     Skin:     General: Skin is warm and dry.      Findings: No lesion or rash.     Neurological:      Mental Status: He is alert and oriented to person, place, and time.      Cranial Nerves: No cranial nerve deficit.      Sensory: No sensory deficit.      Deep Tendon Reflexes: Reflexes are normal and symmetric.     Psychiatric:         Speech: Speech normal.         Behavior: Behavior normal.         Thought Content: Thought content normal.         Judgment: Judgment normal.

## 2025-06-13 NOTE — ASSESSMENT & PLAN NOTE
Orders:  •  ezetimibe (ZETIA) 10 mg tablet; Take 1 tablet (10 mg total) by mouth daily   You can access the FollowMyHealth Patient Portal offered by [facility] by registering at the following website: [email or web address] By joining [employer]’s FollowMyHealth portal, you will also be able to view your health information using other applications (apps) compatible with our system.

## 2025-08-08 ENCOUNTER — OFFICE VISIT (OUTPATIENT)
Dept: UROLOGY | Facility: MEDICAL CENTER | Age: 60
End: 2025-08-08
Payer: COMMERCIAL

## 2025-08-08 VITALS
HEIGHT: 71 IN | SYSTOLIC BLOOD PRESSURE: 116 MMHG | DIASTOLIC BLOOD PRESSURE: 70 MMHG | OXYGEN SATURATION: 98 % | HEART RATE: 76 BPM | BODY MASS INDEX: 24.97 KG/M2

## 2025-08-08 DIAGNOSIS — N52.02 CORPORO-VENOUS OCCLUSIVE ERECTILE DYSFUNCTION: Primary | ICD-10-CM

## 2025-08-08 DIAGNOSIS — N40.0 BENIGN PROSTATIC HYPERPLASIA, UNSPECIFIED WHETHER LOWER URINARY TRACT SYMPTOMS PRESENT: ICD-10-CM

## 2025-08-08 DIAGNOSIS — Z12.5 SCREENING FOR PROSTATE CANCER: ICD-10-CM

## 2025-08-08 PROCEDURE — 99214 OFFICE O/P EST MOD 30 MIN: CPT

## 2025-08-21 DIAGNOSIS — M16.0 ARTHRITIS OF BOTH HIPS: Primary | ICD-10-CM

## 2025-08-22 DIAGNOSIS — M16.0 ARTHRITIS OF BOTH HIPS: Primary | ICD-10-CM
